# Patient Record
Sex: MALE | Race: WHITE | Employment: OTHER | URBAN - METROPOLITAN AREA
[De-identification: names, ages, dates, MRNs, and addresses within clinical notes are randomized per-mention and may not be internally consistent; named-entity substitution may affect disease eponyms.]

---

## 2017-06-30 PROBLEM — I48.91 ATRIAL FIBRILLATION (HCC): Status: ACTIVE | Noted: 2017-06-30

## 2017-06-30 PROBLEM — N28.9 RENAL INSUFFICIENCY: Status: ACTIVE | Noted: 2017-06-30

## 2017-06-30 PROBLEM — R53.83 FATIGUE: Status: ACTIVE | Noted: 2017-06-30

## 2017-06-30 NOTE — PROGRESS NOTES
Mrs. Shelby Tarango is a 49-year-old white female who was seen on June 30, 2017 for her Medicare annual physical examination. At the time of examination Mrs. Rajani Johnson indicated that she has been feeling fatigued.   She notes that she has had a lack of heart reveals S1 and S2 to appear normal.  No murmurs noted. Her lungs are clear. Her abdomen is protuberant. Bowel sounds are present. There are no masses or organomegaly. Her extremities reveals there to be no edema.   All peripheral pulses are intac vitamin D 1000 units daily. Patient will see Dr. Deangelo Francois next week as noted. I will see the patient back in 2 weeks. I will see the patient back sooner as necessary.   As far as the patient's symptoms of fatigue and concern about depression I have josse hazards?: 0-No    Are you on multiple medications?: 1-Yes    Does pain affect your day to day activities?: 0-No     Have you had any memory issues?: 0-No    Fall/Risk Scorin          Depression Screening (PHQ-2/PHQ-9): Over the LAST 2 WEEKS   Little in 20/25 Left Eye Chart Acuity: 20/40     Cognitive Assessment     What day of the week is this?: Correct    What month is it?: Correct    What year is it?: Correct    Recall \"Ball\": Correct    Recall \"Flag\": Correct    Recall \"Tree\": Correct        Zaynab Santana Screening Mammogram      Mammogram  Annually Mammogram,1 Yr due on 10/19/2015 Update Health Maintenance if applicable   Immunizations      Influenza No orders found for this or any previous visit.  Update Immunization Activity if applicable    Pneumococca

## 2017-06-30 NOTE — PATIENT INSTRUCTIONS
1.  Patient is to continue her current diet, medications and activity. 2.  I will increase the patient's Synthroid dose to 0.125 MCG's orally daily. 3.  I will start the patient on Cardizem  mg orally daily.   4.  I will start the patient on Eliquis

## 2017-07-14 NOTE — PATIENT INSTRUCTIONS
1.  Patient is to continue his her current diet, medications and activity. 2.  Patient is to follow-up with Dr. Vero Galvan. 3.  We will consider switching the patient to Xarelto when the patient runs out of her Eliquis prescription.   4.  I will plan to s

## 2017-07-14 NOTE — PROGRESS NOTES
Santos Lewis is a 76year old female. Patient presents with:  Checkup: 2 wk f/u  Hypertension  Atrial Fibrillation    HPI:   Patient presents with:  Checkup: 2 wk f/u  Hypertension  Atrial Fibrillation    Pt feels better.   Pt was back in NSR when sh complaints of pain or swelling in patient's legs    EXAM:   /66 (BP Location: Left arm, Patient Position: Sitting, Cuff Size: large)   Pulse 72   Temp 98.4 °F (36.9 °C) (Oral)   Ht 5' 2\" (1.575 m)   Wt 195 lb 3.2 oz (88.5 kg)   SpO2 96%   BMI 35.70

## 2017-09-13 PROBLEM — E83.52 HYPERCALCEMIA: Status: ACTIVE | Noted: 2017-09-13

## 2017-09-13 NOTE — PATIENT INSTRUCTIONS
1.  Patient is to continue her current diet, medications and activity. 2.  Patient is to increase her vitamin D to 2000 units daily. 3.  Patient may resume her calcium supplementation at this time.   4.  I will increase the patient's Synthroid to 0.150 mg

## 2017-09-13 NOTE — PROGRESS NOTES
Noah Gardner is a 76year old female. Patient presents with:  Checkup: 2 mo f/u  Hypertension  Atrial Fibrillation    HPI:   Patient presents with:  Checkup: 2 mo f/u  Hypertension  Atrial Fibrillation    Pt feels well.   Pt is now on Xarelto as portia Left arm, Patient Position: Sitting, Cuff Size: large)   Pulse 84   Temp 98.2 °F (36.8 °C) (Oral)   Ht 5' 2\" (1.575 m)   Wt 196 lb (88.9 kg)   SpO2 98%   BMI 35.85 kg/m²   GENERAL: well developed, well nourished in no acute distress  HEENT: normal orophar function continues to show renal insufficiency. Patient's BUN is 29, creatinine is 1.17 and GFR is 45. Patient is to push fluids. 8. Vitamin D deficiency  Patient's vitamin D level is slightly low at 27.8.   She is to increase her vitamin D to 2000 uni

## 2017-12-13 NOTE — PATIENT INSTRUCTIONS
1.  She is to continue her current diet, medication and activity. 2.  Patient is to take MiraLAX on a daily basis to see if this helps with her upper abdominal pain. 3.  Patient is to receive her Pneumovax and flu vaccine today.   4.  I will see the patie

## 2017-12-13 NOTE — PROGRESS NOTES
Gricelda Dsouza is a 76year old female. Patient presents with:  Checkup: 3 month. ongoing stomach aches-intermittently. States this has been going on for years. It last different amounts of time in different areas of her abdomen.    Hypertension  Atria HCl ER Coated Beads (CARDIZEM CD) 120 MG Oral Capsule SR 24 Hr Take 1 capsule (120 mg total) by mouth daily. Disp: 30 capsule Rfl: 11   lisinopril 10 MG Oral Tab Take 1 tablet (10 mg total) by mouth every morning.  Disp: 90 tablet Rfl: 3   Triamterene-HCTZ PLAN:   1. Essential hypertension  Doing well.  CPM.  I will see the patient back in 3 months with a BMP panel. Patient was given her flu vaccine and her Pneumovax vaccine today. I will see the patient back sooner as necessary.     2. Atrial fibrillation,

## 2018-01-15 NOTE — TELEPHONE ENCOUNTER
Verified with patient that she is taking the Triamterene-HCTZ every day (not every other day as per med module). Refill sent.

## 2018-02-05 NOTE — TELEPHONE ENCOUNTER
Pt confirmed that she takes lisinopril 10mg daily.      Refill request is for a maintenance medication and has met the criteria specified in the Ambulatory Medication Refill Standing Order for eligibility, visits, laboratory, alerts and was sent to the requ

## 2018-02-05 NOTE — TELEPHONE ENCOUNTER
Refill request received for lisinopril 10mg QD from [employer]. Last refill authorization sent 12/23/16 # 90 with 3 refills. Attempted to call patient to clarify the refill request. Gilbert.

## 2018-03-11 NOTE — PROGRESS NOTES
Nasim Zepeda is a 76year old female. HPI:     CC:  Patient presents with:  Full Skin Exam: LOV- 5-18-16. Pt presenting today with dry lesion to nose x several years and requesting a full body skin check.  Pt denies a personal/family hx of SC. MG Oral Capsule SR 24 Hr Take 1 capsule (120 mg total) by mouth daily. Disp: 30 capsule Rfl: 11   Probiotic Oral Cap Take 1 capsule by mouth daily. take 1 tab daily  Disp:  Rfl:    Omega 3 1000 MG Oral Cap Take  by mouth.  take 1 by Oral route  every day Jayashree Eden total-body performed, including scalp, head, neck, face,nails, hair, external eyes, including conjunctival mucosa, eyelids, lips external ears, back, chest,/ breasts, axillae,  abdomen, arms, legs, palms.      Multiple light to medium brown, well marginated keratosis  Extensive lesions. Reassurance given. Lentigo darker slightly eccentric center at left forearm unchanged. Continue observation.     No other susupicious lesions on todays  Exam.    Patient with moderate to severe sun damage spends arce in

## 2018-03-28 NOTE — PATIENT INSTRUCTIONS
1.  Patient is to continue her current diet, medication and activity. 2.   I will plan see the patient back in 3 months with blood tests and urinalysis for her annual physical examination.   The blood tests will include a CBC, CMP, lipid panel, TSH and vi

## 2018-03-28 NOTE — PROGRESS NOTES
Crystal Ratliff is a 76year old female. Patient presents with:  Checkup: 3 month  Hypertension  Atrial Fibrillation    HPI:   Patient presents with:  Checkup: 3 month  Hypertension  Atrial Fibrillation    Pt feels well.  Pt is getting over a recent col Packs/day: 1.00      Years: 50.00        Types: Cigarettes  Smokeless tobacco: Never Used                      Alcohol use:  Yes              Comment: 3 drinks/week       REVIEW OF SYSTEMS:   GENERAL HEALTH: feels well otherwise  R triglycerides 143, HDL cholesterol is 42 and LDL cholesterol was 122. CPM.    5. Elevated fasting glucose  Doing well.  CPM.  Patient's recent FBS was 100.    6. Primary osteoarthritis involving multiple joints  Stable.   CPM.    7. Renal insufficiency  St

## 2018-06-29 NOTE — PROGRESS NOTES
HPI:   Hansel Hernandez is a [de-identified]year old female who was seen by me for her Medicare annual physical examination on June 29, 2018. At the time of examination Mrs. Keo Lopez was feeling well. She has no complaints.   Patient did not have the previously 11      Past Medical History:   Diagnosis Date   • A-fib ([facility] [address] 2017      Past Surgical History:  No date: NEEDLE BIOPSY LEFT   Family History   Problem Relation Age of Onset   • Heart Disease Father 61     CAD   • Cancer Daughter      THYROID   • Cancer So Alert and oriented, CN are intact, DTRs are 1+ bilaterally    Patient did not have an EKG today as she has her EKGs with her cardiologist.  Patient did not have blood tests or urinalysis prior to today's examination.   She will have them done after today's than 10 pounds without trying?: 2 - No    Has your appetite been poor?: No    Type of Diet: Balanced    How does the patient maintain a good energy level?:  (exercise-not daily)    How would you describe your daily physical activity?: Light    How would yo energy: More than half the days  5. Poor appetite or overeating: More than half the days  6. Feeling bad about yourself - or that you are a failure or have let yourself or your family down: Several days  7.  Trouble concentrating on things, such as reading because I misunderstand what they say:  No   I misunderstand what others are saying and make inappropriate responses:  Sometimes I avoid social activities because I cannot hear well and fear I will reply improperly:  No   Family members and friends have to Visit Annually Pt sees her Ophthalmologist.    Grant Ricci Every two years Last Dexa Scan:   XR DEXA BONE DENSITOMETRY (CPT=77080) 06/22/2018    No flowsheet data found.    Pap and Pelvic      Pap: Every 3 yrs age 21-65 or Pap+HPV e Annually No results found for: A1C No flowsheet data found. Creat/alb ratio  Annually      LDL  Annually LDL Cholesterol (mg/dL)   Date Value   06/29/2018 150 (H)   06/22/2016 110 (H)    No flowsheet data found.      Dilated Eye exam  Annually No flowshe

## 2018-06-29 NOTE — PATIENT INSTRUCTIONS
1.  Patient is to continue her current diet, medication and activity. 2.  Patient to continue to watch her diet and exercise on a regular basis. 3.  Patient will have previously ordered blood tests and urinalysis performed today or in the near future.   I

## 2018-09-01 NOTE — TELEPHONE ENCOUNTER
Telephone call to pt and recent labs were discussed with pt. Pt will watch her diet regarding her Cholesterol. Pt is to continue to take Vitamin D. Pt has an appt to see me in January.   I will place an order in the sytem for pt to have blood tests prior

## 2018-10-08 NOTE — PROGRESS NOTES
Felipe Marshall is a [de-identified]year old female. HPI:     CC:  Patient presents with:  Lesion: LOV: 2-26-18. Pt presents today with lesion under L eye x 1 year pt denies a personal/family hx of SC. Allergies:  Brimonidine;  Latanoprost    HISTORY: Oral route  every day Disp:  Rfl:    Levothyroxine Sodium (SYNTHROID) 150 MCG Oral Tab Take 1 tablet (150 mcg total) by mouth before breakfast. Disp: 90 tablet Rfl: 3     Allergies:     Brimonidine             OTHER (SEE COMMENTS)    Comment:Red eyes  Tali with lesion under L eye x 1 year pt denies a personal/family hx of SC. Patient presents with concerns above. Patient has been in their usual state of health. History, medications, allergies reviewed as noted.       ROS:  Denies any other systemic Reassurance given regarding benign seborrheic keratosis no treatment necessary fairly close to the eyes so would hold off on cryo. Patient concerned since her  with [facility] diagnosed at left lower lid. Reassurance given.   Observe currently this change

## 2019-01-07 NOTE — PATIENT INSTRUCTIONS
1.  Patient is to continue her current diet, medication and activity. 2.  She is to continue to watch her diet, remain active and attempt to watch her weight. 3.  Patient will have a flu vaccine and shingles vaccine in the near future.   4.  I will see th

## 2019-01-07 NOTE — PROGRESS NOTES
Hansel Hernandez is a [de-identified]year old female. Patient presents with:  Checkup: 6 month check up. Patient feels well. She will get her flu shot and her second shingles with her son who is a physician.    Hypertension  Atrial Fibrillation  Hyperlipidemia  Thyr 50.00        Pack years: 50        Types: Cigarettes      Smokeless tobacco: Never Used    Alcohol use: Yes      Comment: 3 drinks/week    Drug use: No       REVIEW OF SYSTEMS:   GENERAL HEALTH: feels well otherwise  RESPIRATORY:No cough or SOB  CARDIOVASC insufficiency  Patient's renal function is improved from before. Her recent BUN was 19, creatinine is 1.05 and GFR is 50.   CPM.    8. Vitamin D deficiency  Doing well.  CPM.  Patient's vitamin D level is 39.8.  CPM.    9. Diverticulosis of large intestine

## 2019-06-10 NOTE — PROGRESS NOTES
Noah Gardner is a 78year old female. Patient presents with:  Derm Problem: New pt pt presenting today with rash to bilateral arms and legs for 10 days. pt has been using cerave and amalactin, some improvement. Brimonidine;  Birdia Collet years: 50        Types: Cigarettes      Smokeless tobacco: Never Used    Alcohol use: Yes      Comment: 3 drinks/week    Drug use:  No                  Current Outpatient Medications:  Calcium (RA CALCIUM) 500 MG Oral Tab once daily Disp:  Rfl:    E[place]april Socioeconomic History      Marital status:       Spouse name: Not on file      Number of children: Not on file      Years of education: Not on file      Highest education level: Not on file    Occupational History      Not on file    Social Needs Grandmother 80   • Breast Cancer Maternal Aunt 75                      HPI :      Patient presents with:  Derm Problem: New pt pt presenting today with rash to bilateral arms and legs for 10 days. pt has been using cerave and amalactin, some improvement. various products including moisturizers, creams soaps cleansers detergent etc. reviewed with patient. Potential allergen, irritants reviewed as well. Pathophysiology reviewed. Consider Contact allergy in differential.  Consider patch testing.   Patient w

## 2019-09-05 PROBLEM — N18.30 STAGE 3 CHRONIC KIDNEY DISEASE (HCC): Status: ACTIVE | Noted: 2019-09-05

## 2019-09-05 NOTE — PATIENT INSTRUCTIONS
1.  Patient is to continue her current diet, medication and activity. 2.  I will decrease the patient's dose of levothyroxine to 0.137 mg orally daily. 3.  I will stop the patient's triamterene/Dyazide today.   4.  I will plan to see the patient back in 3

## 2019-09-06 NOTE — PROGRESS NOTES
HPI:   Mora Jacinto is a 78year old female who was seen by me on September 5, 2019 for her Medicare annual physical examination. At the time of examination Mrs. Adriana Marie was feeling well.   Patient's daughter has asked the patient to check on why SL Tab Place 1 capsule under the tongue daily. Disp:  Rfl:    Rivaroxaban (XARELTO) 20 MG Oral Tab Take 20 mg by mouth daily with food. Disp:  Rfl:    Multiple Vitamins-Minerals (MULTIVITAL-M) Oral Tab Take 1 tablet by mouth daily.  Disp:  Rfl:    Polyethyl TM's  EYES:PERRLA, EOMI,conjunctiva are clear, sclerae are nonicteric  NECK: supple, no cervical or supraclavicular LAD, no carotid bruits, no JVD  CHEST: no chest tenderness  BREAST: Patient's breasts were not examined at the request of the patient.   LUNG lisinopril as necessary. 3. Hypercholesteremia  Patient's recent lipid panel had a cholesterol 182, triglycerides 154, HDL cholesterol is 46 and LDL cholesterol was 105. Patient is to continue her dietary control.    - LIPID PANEL; Future    4.  Atrial more than 10 pounds without trying?: 2 - No    Has your appetite been poor?: No    Type of Diet: Balanced    How does the patient maintain a good energy level?: Other    How would you describe your daily physical activity?: Moderate    How would you descri Uncorrected   Right Eye Chart Acuity: 20/50 Left Eye Chart Acuity: 20/50     Cognitive Assessment     What day of the week is this?: Correct    What month is it?: Correct    What year is it?: Correct    Recall \"Ball\": Correct    Recall \"Flag\": Correct Annually if high risk No results found for: CHLAMYDIA No flowsheet data found. Screening Mammogram      Mammogram  Annually There are no preventive care reminders to display for this patient.  Update Health Maintenance if applicable   Immunizations

## 2019-12-09 PROBLEM — E21.3 HYPERPARATHYROIDISM (HCC): Status: ACTIVE | Noted: 2019-12-09

## 2019-12-09 NOTE — PROGRESS NOTES
Tamela Irizarry is a 78year old female. Patient presents with:  Checkup: 3 month check up. Fees well. Feeling like she has less patience lately.    Hypertension  Hyperlipidemia  Thyroid Problem    HPI:   Patient presents with:  Checkup: 3 month check u GENERAL HEALTH: feels well otherwise  RESPIRATORY:No cough or SOB  CARDIOVASCULAR: No chest pain  GI: No abdominal pain, nausea, vomiting, diarrhea, or constipation  :No Urinary complaints  EXT:No complaints of pain or swelling in patient's legs    EXA this time. Her main treatment at this time is her rate control. 4. Hypothyroidism, unspecified type  Doing well.  CPM.  Patient's recent TSH was 0.505. Patient's dose of Synthroid was decreased at her last visit.   Patient's current dose of Synthroid a

## 2019-12-09 NOTE — PATIENT INSTRUCTIONS
1.  Patient is to continue her current diet, medication and activity. 2.  Patient will obtain her flu vaccine and her Tdap vaccine from her son.   3.  I will refer the patient see Dr. Elie Romero for evaluation of her hypercalcemia and probable hyperparath

## 2019-12-10 NOTE — TELEPHONE ENCOUNTER
Salma Nunez,      Mrs. Surya Juarez is a patient under my care. I have been following and evaluating her hypercalcemia for some time. There are times it is come down other times it has come back up.   Patient had a recent blood test that showed

## 2019-12-11 NOTE — TELEPHONE ENCOUNTER
Thank you for the update. It does look like she got an earlier appointment with Dr. Radames Lindsay so I will also forward information to her.

## 2020-01-07 NOTE — TELEPHONE ENCOUNTER
This can wait till Wednesday. Patient made an appt with Dr. Jonathan Galicia for 3/19 per the instructions patient was given at last office visit for a parathyroid check.     Patient also made an appointment for 1/31/20 with Dr. Morena Acuna who is in the same pract

## 2020-01-09 NOTE — TELEPHONE ENCOUNTER
Patient was seen today at the time of her 's examination. Patient has pain in the left side of her neck and ecchymosis over her anterior neck. I will place an order in the system for the patient get an ultrasound of her neck.   Patient will also ha

## 2020-01-09 NOTE — TELEPHONE ENCOUNTER
Noted.  Discussed with patient. I told her that it is fine to proceed and see Dr. Eb Simeon January 31 as she is currently scheduled to do.

## 2020-01-31 NOTE — H&P
New Patient Evaluation - History and Physical    CONSULT - Reason for Visit:  Hypoparathyroidism  Requesting Physician: Dr. Aislinn Tay:  Patient presents with:  Consult: Hypothyroidism, parathyroid issues.  Referred by Dr. Gio Jones capsule by mouth daily. take 1 tab daily      • Polyethylene Glycol 3350 (MIRALAX) Oral Powd Pack Take 17 g by mouth daily.  1 packet 11       ALLERGIES:    Brimonidine             OTHER (SEE COMMENTS)    Comment:Red eyes  Latanoprost             OTHER (SEE for: rash, blister, cellulitis,      PHYSICAL EXAM:    01/31/20  0931   BP: 121/74   Pulse: 74   Weight: 196 lb 9.6 oz (89.2 kg)     BMI: Body mass index is 35.96 kg/m².      CONSTITUTIONAL:  awake, alert, cooperative, no apparent distress, and appears stat health  Discussed that PTH is a RF for Osteoporosis. - Repeat DXA in July 2020  - no h/o Tobacco, alcohol, steroid use  - Vitamin D levels normal in 7/2020. Will follow Vit D levels.    - Fall precautions    Patient verbalized a complete  understanding of

## 2020-02-03 NOTE — TELEPHONE ENCOUNTER
From: Kelvin Mccollum  To: Guille Dennis MD  Sent: 2/1/2020 8:41 AM CST  Subject: Other    I am canceling my appt. with Dr. Kalyn Andrade for March 16th.   Thank you,  Akua Devries   [phone]

## 2020-05-12 NOTE — TELEPHONE ENCOUNTER
Please advise  Component      Latest Ref Rng & Units 5/6/2020   Glucose      70 - 99 mg/dL 100 (H)   Sodium      136 - 145 mmol/L 142   Potassium      3.5 - 5.1 mmol/L 4.4   Chloride      98 - 112 mmol/L 110   Carbon Dioxide, Total      21.0 - 32.0 mmol/L

## 2020-05-13 NOTE — TELEPHONE ENCOUNTER
PTh has high PTh that is cuasing her calcium to be high  We are monitoring ca for now  During her visit with me she wa son ca and MV, which I had recommended she stop  Did she stop it?    If not, she should stop  Her recent calcium is a little higher than b

## 2020-05-14 NOTE — TELEPHONE ENCOUNTER
Attempted to call pt, although after ringing multiple times, it goes straight to dial tone. Will attempt to contact patient at a later time.

## 2020-05-18 NOTE — TELEPHONE ENCOUNTER
Called patient with blood test results. Patient verbalized understanding and states she did stop MV supplementation /CA  Will repeat labs in 3-4 weeks.

## 2020-05-18 NOTE — TELEPHONE ENCOUNTER
Called patient with blood test results. Patient verbalized understanding and states she did stop MV supplementation /CA  Will repeat labs in 3-4 weeks.   Lab ordered

## 2020-05-18 NOTE — TELEPHONE ENCOUNTER
From: Gricelda Dsouza  To: Sea Gallegos MD  Sent: 5/18/2020 11:54 AM CDT  Subject: Test Results Question    I had blood work done on May 6th as I was supposed to.  I have read the results on my iPad and really have no idea how to decipher them and

## 2020-06-16 NOTE — TELEPHONE ENCOUNTER
Her recent calcium is high but stable  It is 10.6 now.    PLAN:   - Repeat CMP, PTH, 25 OH vitamin D  in 4 months to monitor for stability  - Hydration  - Stay active as much as possible  - Call if she develops symptoms of high calcium    Please book apt in

## 2020-06-17 NOTE — TELEPHONE ENCOUNTER
Called patient and gave her message by dr Gurpreet Hurt. Patient verbalized understanding and will have labs repeated in 4 mo. Labs ordered.

## 2020-07-09 NOTE — TELEPHONE ENCOUNTER
DXA shows normal BMD  Patient is to repeat labs in 4 months from mid of July and FU in clinic ( please see encounter from June 2020)  Please book apt now, if it's not booked on time, we are not able to find slots closer to the time  Thanks

## 2020-07-10 NOTE — TELEPHONE ENCOUNTER
Called patient with message by dr Olena Brooks. Patient verbalized understanding and made laura 11/10/20 with provider. Labs are ordered.

## 2020-09-02 NOTE — TELEPHONE ENCOUNTER
To Dr. Abdirashid Bradley----    Per LOV:  \"I will plan to see the patient back in 3 months with blood tests which will include a BMP, lipid panel. \"    BMP and lipid pended for review. Did you want additional labs?

## 2020-09-02 NOTE — TELEPHONE ENCOUNTER
Noted.  I reviewed patient's chart. Patient is actually due for her Medicare annual physical examination. I have adjusted the patient's orders. Please notify the patient that I have placed orders in the system to be done and that her exam should be her Medicare annual physical examination. I will route this to nursing.   Thank you!!

## 2020-09-03 NOTE — TELEPHONE ENCOUNTER
changed appt to Jazzy Li  Not able to schedule 60 minute appt as appt already in time slot after pt appt

## 2020-09-03 NOTE — TELEPHONE ENCOUNTER
S/w spouse vicente and relayed MDs message. Informed spouse that patient needs to fast and schedule a lab appt online.      To  to change 9/9 appt to a medicare annual exam.

## 2020-09-24 NOTE — TELEPHONE ENCOUNTER
Labs stable. Calcium remains high  I will discuss further at her upcoming apt.    Apt is in nov   Please repeat CMP 2-3 days before apt   Do not repeat before that  Please review symptoms of high calcium, to call if she experiences any  Thansk

## 2020-09-25 NOTE — TELEPHONE ENCOUNTER
rn called patient and gaver her message by dr Yang Nicole, patient verbalized understanding of instructions.  Via repeat back and states will write down  Reminded of laura 11/10/20 at 9am  Also inqured about supplements states she is taking mvi and vit D 5000 u

## 2020-09-25 NOTE — TELEPHONE ENCOUNTER
Attempted to contact Maritza Olmedo:    Patients , Nick Hernandez, answered and asked to call back in 10 minutes so he could go put his hearing aids on. Called again, and , Nick Hernandez, states she is not home.  Advised  to have her call clinic back re

## 2020-10-09 PROBLEM — N18.31 STAGE 3A CHRONIC KIDNEY DISEASE (HCC): Status: ACTIVE | Noted: 2019-09-05

## 2020-10-09 PROBLEM — M15.9 PRIMARY OSTEOARTHRITIS INVOLVING MULTIPLE JOINTS: Status: ACTIVE | Noted: 2020-10-09

## 2020-10-09 NOTE — PROGRESS NOTES
HPI:   An Glez is a 78year old female who was seen by me on October 9, 2020 for her Medicare annual physical examination. At the time the examination Mrs. Radha Pickett was feeling well.   Patient was placed on Prozac earlier this year on the rec DAILY 90 capsule 3   • Levothyroxine Sodium 137 MCG Oral Tab Take 137 mcg by mouth before breakfast. 90 tablet 3   • Cholecalciferol (VITAMIN D) 2000 units Oral Cap Take 1 capsule by mouth daily.      • Cyanocobalamin (VITAMIN B-12) 5000 MCG Sublingual SL T 34.59 kg/m²     GENERAL: well developed, well nourished,in no apparent distress  SKIN: no rashes,no suspicious lesions  HEENT: atraumatic, normocephalic, normal oropharynx, normal TM's  EYES:PERRLA, EOMI,conjunctiva are clear, sclerae are nonicteric  NECK: up-to-date on her pneumonia vaccines. I will start the patient on Keflex 250 mg orally 4 times a day for 10 days. Patient will have a follow-up urinalysis with urine culture reflex after she completes her course of antibiotics.   I have advised the patien joints  Stable. CPM.    9. Stage 3a chronic kidney disease  Stable. CPM.  Patient's recent CMP had a BUN of 17, creatinine 1.08 and a GFR of 48. Patient's renal function has been stable recently. - BASIC METABOLIC PANEL (8); Future    10.  Vitamin D d help    Dressing: Able without help    Eating: Able without help    Driving: Able without help    Preparing your meals: Able without help    Managing money/bills: Able without help    Taking medications as prescribed: Able without help    Are you able to a 09/22/2020 95    No flowsheet data found. Cardiovascular Disease Screening     LDL Annually LDL Cholesterol (mg/dL)   Date Value   09/08/2020 121 (H)        EKG One Time EKG was done torobles.     Colorectal Cancer Screening      Colonoscopy Screen every 1 Immunization Activity if applicable     SPECIFIC DISEASE MONITORING Internal Lab or Procedure External Lab or Procedure   Annual Monitoring of Persistent     Medications (ACE/ARB, digoxin, diuretics)    Potassium  Annually Potassium (mmol/L)   Date Value

## 2020-10-09 NOTE — PATIENT INSTRUCTIONS
1.  Patient is to continue her current diet, medication and activity. 2.  Patient has recently received her flu vaccine. 3.  Patient is up-to-date on her pneumonia vaccines.   4.  I will place the patient on Keflex/cephalothin 250 mg orally 4 times a day

## 2020-11-06 NOTE — TELEPHONE ENCOUNTER
Noted. Please call patient and notify her that her recent mammogram has turned out well. There is no evidence of breast cancer. I will route this to nursing.   Thank you!!

## 2020-12-02 NOTE — TELEPHONE ENCOUNTER
Telephone call to patient and recent lab results discussed. Patient's fasting blood sugar slightly elevated. Patient has chronic kidney disease stage III which appears stable.   Patient's lipid panel showed her cholesterol and LDL cholesterol to be slight

## 2020-12-22 NOTE — TELEPHONE ENCOUNTER
Please advise latest labs in 9/2020    Component      Latest Ref Rng & Units 9/22/2020   PTH INTACT      18.5 - 88.0 pg/mL 213.6 (H)

## 2020-12-30 NOTE — TELEPHONE ENCOUNTER
rn called patient with message by dr Roz Lawrence, patient verbalized understanding ,had no further questions

## 2020-12-30 NOTE — PROGRESS NOTES
Return Office Visit     CHIEF COMPLAINT:     Primary Hyperparathyroidism    HISTORY OF PRESENT ILLNESS:  Amanda Ga is a 78year old female who presents for follow up for  evaluation of hyperparathyroidism.    Noted to have calcium elevation for t family history marked as reviewed. See past social history marked as reviewed.     ASSESSMENTS:     REVIEW OF SYSTEMS:  Constitutional: Negative for: weight change, fever, fatigue, cold/heat intolerance  Eyes: Negative for:  Visual changes, proptosis, blur milk/calcium consumption. No history of immobilization. -    TSH is normal. No symptoms or signs of adrenal insufficiency or acromegaly. The patient has primary hyperparathyroidism.   DA normal 2020  Based on CKD, she does meet criteria for surgical manag

## 2021-02-15 NOTE — PATIENT INSTRUCTIONS
1.  Patient is to continue her current diet, medication and activity. 2.  Patient is to get her second Covid vaccine as she is scheduled to receive. 3.  I I will plan to see the patient back in 4 months with blood tests as ordered.   4.  I will see the pa

## 2021-02-15 NOTE — PROGRESS NOTES
Lidia Torres is a 78year old female. Patient presents with:  Checkup  Hypertension  Hyperlipidemia  Thyroid Problem    HPI:   Patient presents with:  Checkup  Hypertension  Hyperlipidemia  Thyroid Problem    Pt feels well.   Pt's 81 y/o mother is st Location: Right arm, Patient Position: Sitting, Cuff Size: large)   Pulse 64   Temp 98.3 °F (36.8 °C) (Oral)   Ht 5' 2\" (1.575 m)   Wt 189 lb (85.7 kg)   SpO2 99%   BMI 34.57 kg/m²   GENERAL: well developed, well nourished in no acute distress  HEENT: nor CPM.    11. Fatigue, unspecified type  Doing well.  CPM.      The patient indicates understanding of these issues and agrees to the plan. The patient is asked to return in 4 months with blood tests as noted above. Rin Luis MD  2/15/2021  10:

## 2021-06-17 NOTE — PATIENT INSTRUCTIONS
1.  Patient is to continue her current diet, medication and activity. 2.  Patient has had her Covid vaccines. 3.  I will see the patient back in 4 months with blood tests, urinalysis and EKG for her annual physical examination.   4.  I will see the patichelle

## 2021-06-17 NOTE — PROGRESS NOTES
Beltran Youngblood is a 78year old female. Patient presents with:  Checkup  Hypertension  Hyperlipidemia  Thyroid Problem  Atrial Fibrillation    HPI:   Patient presents with:  Checkup  Hypertension  Hyperlipidemia  Thyroid Problem  Atrial Fibrillation Packs/day: 1.00        Years: 50.00        Pack years: 50        Types: Cigarettes      Smokeless tobacco: Never Used    Vaping Use      Vaping Use: Never used    Alcohol use: Yes      Comment: 3 drinks/week    Drug use: No       REVIEW OF SYSTEMS:   GENER atrial fibrillation (HCC)  Stable. CPM.  Patient's rate appears to be controlled. Patient's pulse today is about 72 bpm.    4. Hypothyroidism, unspecified type  Stable. CPM.    5. Elevated fasting glucose  Doing well.  CPM.  Patient's recent FBS was 96.

## 2021-06-17 NOTE — TELEPHONE ENCOUNTER
Patient called back , rn gave her message by dr Jaquelin Beaulieu. below.  states she was taking every other day but will discontinue and will discuss further at laura

## 2021-06-28 NOTE — PROGRESS NOTES
Return Office Visit     CHIEF COMPLAINT:     Primary Hyperparathyroidism    HISTORY OF PRESENT ILLNESS:  Garth Acuña is a 78year old female who presents for follow up for  evaluation of hyperparathyroidism.    Noted to have calcium elevation for t reviewed. See past family history marked as reviewed. See past social history marked as reviewed.     ASSESSMENTS:     REVIEW OF SYSTEMS:  Constitutional: Negative for: weight change, fever, fatigue, cold/heat intolerance  Eyes: Negative for:  Visual taylor insufficiency or acromegaly. The patient has primary hyperparathyroidism. DA normal 2020  Based on CKD, she does meet criteria for surgical management. I again explained this in detail to the patient.    After a discussion, she will like to observe at t

## 2021-06-28 NOTE — TELEPHONE ENCOUNTER
Repeat calcium is much better  Let us continue to hold vitamin D  Repeat PTH, CMP and 25 OH vitamin D in three months and call for results  Thanks

## 2021-07-01 NOTE — TELEPHONE ENCOUNTER
Spoke to patient to relay message below - patient stated understanding to continue to hold vitamin D and repeat labs in 3 months  Labs ordered

## 2021-07-06 PROBLEM — F43.23 ADJUSTMENT DISORDER WITH MIXED ANXIETY AND DEPRESSED MOOD: Status: ACTIVE | Noted: 2021-07-06

## 2021-09-23 NOTE — TELEPHONE ENCOUNTER
UTI symptoms:    []Frequency  []Urgency  []Pain/burning  [x]Blood in urine  []Low back pain  []Flank pain  []Fevers/chills  []Odor  [x]Confusion    NOTES:    Symptom onset: Today. Reports 2 episodes of hematuria.  The first episode was pink in color and lar

## 2021-09-23 NOTE — PATIENT INSTRUCTIONS
You are seen in clinic for bloody urine that she started today. There is concern for possible urinary tract infection for which we are obtaining lab work including blood tests and a urine test.  We will call you with the results of these tests.     We do r

## 2021-09-23 NOTE — PROGRESS NOTES
Chief Complaint:   Patient presents with:  Urinary: Symptoms started this morning, she had a pink shade in the urine. No fever, no back pain. She has had this a few months ago, this was treated by Dr. Helen Bailey.   She did stop her fluoxetine about 3 months Refill   • LISINOPRIL 10 MG Oral Tab TAKE ONE TABLET BY MOUTH IN THE MORNING  90 tablet 3   • LEVOTHYROXINE SODIUM 137 MCG Oral Tab TAKE ONE TABLET BY MOUTH BEFORE BREAKFAST 90 tablet 3   • dilTIAZem HCl ER Coated Beads (CARTIA XT) 120 MG Oral Capsule SR 2 Lymphadenopathy  Endocrine: Polyuria, Polydipsia, Temperature Intolerance    EXAM:   Vital Signs:  Blood pressure 130/82, pulse 96, temperature 98.3 °F (36.8 °C), temperature source Oral, height 5' 2\" (1.575 m), weight 190 lb (86.2 kg), SpO2 97 %.      Con period, some results have not been displayed. A complete set of results can be found in Results Review. No results found for this or any previous visit (from the past 8760 hour(s)).     Last positive UCx 9/8/2020  Susceptibility     Escherichia coli documentation.      Jaleesa Cisneros MD, 09/23/21, 2:40 PM

## 2021-09-23 NOTE — TELEPHONE ENCOUNTER
Patient calling for advise. Blood in urine starting this morning. [address] and more so forgetful. No other symptoms. Patient is on blood thinners.     Best call back number [phone]    [address] [place] [address]

## 2021-09-24 NOTE — TELEPHONE ENCOUNTER
Called for condition update:    [facility] - need condition update on hematuria, also has lab results and urine results    FYI to the pool

## 2021-09-28 NOTE — TELEPHONE ENCOUNTER
Discussed with Dr. Yolis Clark-- he would like to speak with patient for update.     TO Dr. Yolis Clark

## 2021-09-28 NOTE — TELEPHONE ENCOUNTER
Condition update:    Hematuria has resolved. She held her Xarelto for 2 days and safely resumed at 3 days from when I saw her. Her urine is clear yellow.   I reviewed the urine culture which came back negative, however there was a significant amount of py

## 2021-10-05 NOTE — TELEPHONE ENCOUNTER
Pt is upset she has to wait until 12/14 to see  for appt - she just had blood work done a couple of weeks ago - asking if she will need to re do tests before appt

## 2021-10-06 NOTE — TELEPHONE ENCOUNTER
Saw other message by you giving  tests results Dr Tyrone Perez want to fit her in sooner to discuss these results or have pt repeat in 3 mo with fu? .  Please advise

## 2021-10-06 NOTE — PROGRESS NOTES
Return Office Visit     CHIEF COMPLAINT:     Primary Hyperparathyroidism    HISTORY OF PRESENT ILLNESS:  Gricelda Dsouza is a 78year old female who presents for follow up for  evaluation of hyperparathyroidism.    Noted to have calcium elevation for t Negative for: weight change, fever, fatigue, cold/heat intolerance  Eyes: Negative for:  Visual changes, proptosis, blurring  ENT: Negative for:  dysphagia, neck swelling, dysphonia  Respiratory: Negative for:  dyspnea, cough  Cardiovascular: Negative for: again explained this in detail to the patient. After a discussion, she will like to observe at this time.    Recent calcium is 10.8  Vitamin D is low, however,  we will avoid replacement at this time given that can raise her calcium even more     PLAN:  -

## 2021-10-18 PROBLEM — E78.1 HYPERTRIGLYCERIDEMIA: Status: ACTIVE | Noted: 2021-10-18

## 2021-10-18 PROBLEM — R31.0 GROSS HEMATURIA: Status: ACTIVE | Noted: 2021-10-18

## 2021-10-18 NOTE — PROGRESS NOTES
HPI:   Zia Ardon is a 78year old female who was seen by me on October 18, 2021 for her Medicare annual physical examination. At the time of examination Mrs. Dale Hoffman was feeling okay.   Her main problem at this time is much stress that she is take 1 tab daily         Past Medical History:   Diagnosis Date   • A-fib ([facility] [address] 2017      Past Surgical History:   Procedure Laterality Date   • NEEDLE BIOPSY LEFT      done over 20yrs ago.  benign      Family History   Problem Relation Age of Onset   • Radha weeks.  LUNGS: clear to auscultation  CARDIO: RRR, normal S1S2, no murmurs  GI: Protuberant, BS are present, no masses or organomegaly  MUSCULOSKELETAL: back is not tender, no pain or swelling in her legs  EXTREMITIES: no edema, all pulses are intact  NEUR blood pressure is doing well.  CPM.  As above. 3. Permanent atrial fibrillation (HCC)  Stable. CPM.  Patient is maintained on Xarelto at 20 mg orally daily. Patient follows up with her cardiologist, Dr. Lucas Mercedes.     4. Stage 3a chronic kidney diseas  has been an \"in control type of person\". It is difficult for the patient to see her  with a dementia and is also difficulty dealing with some of his changes in personality related to this.   Patient is currently seeing a counselor to help help    Managing money/bills: Able without help    Taking medications as prescribed: Able without help    Are you able to afford your medications?: Yes    Hearing Problems?: Yes     Functional Status     Hearing Problems?: Yes    Vision Problems? : Yes Correct    What month is it?: Correct    What year is it?: Correct    Recall \"Ball\": Correct    Recall \"Flag\": Correct    Recall \"Tree\": Correct        Aurelia Austin's SCREENING SCHEDULE   Tests on this list are recommended by your physician b Update Immunization Activity if applicable    Pneumococcal Orders placed or performed in visit on 12/13/17   • PNEUMOCOCCAL IMM (PNEUMOVAX)   Orders placed or performed in visit on 12/08/14   • PNEUMOCOCCAL VACC, 13 PRINCE IM    Update Immunization Activity i

## 2021-10-18 NOTE — TELEPHONE ENCOUNTER
Staff,  When you call patient, please first offered her tomorrow Tuesday at 12 noon as a consult slot; if that does not work, then look at the other message.   Thank you, Dr. Kristine Quezada

## 2021-10-18 NOTE — TELEPHONE ENCOUNTER
Staff, Dr. Matthew Kim wants me to see this patient as a consult. See if there are any cancellations for this week; if not, please check with [place] if I could see patient this Thursday morning 9 AM.  Please let me know.   Thank you, Dr. Sissy Day

## 2021-10-18 NOTE — PATIENT INSTRUCTIONS
1.  Patient is to continue her current diet, medication and activity. 2.  Patient has received her Covid vaccines. She will obtain her booster when it becomes available. 3.  Patient was given her flu vaccine today.   4.  I will refer the patient see

## 2021-10-18 NOTE — TELEPHONE ENCOUNTER
Salma Dorantes,    I have asked Mrs Danita Mcdermott to see you for evaluation of and episode of gross hematuria that she had a few weeks ago. Mrs Enoc Peck has a history of Atrial Fib and is maintained on Xaralto.  Pt recently has an episode of gross hematu

## 2021-10-21 NOTE — PROGRESS NOTES
Adolfo Clark is a 78year old female. Reason for Consultation:       History provided by Patient.  Referred by Dr. Christo Bailey      History of Present Illness:       Gross Hematuria   Problem started 9/23/2021 when patient had a single episode of g patient's recent calcium was 10.8, patient's recent  parathryroid hormone was 192.4;  Recommend see Dr. Balwinder Emmanuel for gross hematuria\"    Urological History--This is the patient;s first time seeing a urologist     Smoking History & Fume Exposure-- Smoked 1 pack 20 MG Oral Tab Take 20 mg by mouth daily with food. • Probiotic Oral Cap Take 1 capsule by mouth daily. take 1 tab daily      • Polyethylene Glycol 3350 (MIRALAX) Oral Powd Pack Take 17 g by mouth daily.  1 packet 11       Allergies:    Brimonidine effort  Cardiac: irregular rate; regular rhythm ; normal S, S2 ;   Murmurs none  Abdomen: soft, non-tender, non-distended, no organomegaly noted, no masses  Genitourinary:   Flanks  Non-tender to palpation  Bladder normal.  Skin/Hair: no unusual rashes pre single episode of gross hematuria;F ollowed by an episode of small amount of light pink urine. No associated pain or dysuria. On 09/23/2021 RBC = >10. Pt is at increased risk for having tumors or stones of the urinary tract and these need to be excluded.  I = 52. Patient follows up with her PCP, Dr. Sarika Franklin, for this.    (Q13.715) History of UTI  Patient state that she has only had two UTIs. Most recent infection 9/08/2020 Urine culutre = 10,000 - 50,000 CFU/ML Escherichia coli (pansensitive).  Patient Demetrius Gardner to notify you of the results of the CT urogram before performing the procedure. 4. Please hold rivaroxaban–Xarelto for 2 days before the procedure    5. Eat breakfast and lunch on the day of office cystoscopy because we will not be putting you to sleep.

## 2021-10-21 NOTE — PATIENT INSTRUCTIONS
Heddy Schlatter, M.D.      1.  Urine specimen today for cytology--we will notify you of the results by means of \"my chart\".     2. Please schedule CT urogram  --- to investigate of the urinary tract, seeking to fi

## 2021-11-03 NOTE — TELEPHONE ENCOUNTER
rn called patient with message below by dr John Brown, pt verbalized understanding of results , states she doesn't take sunlight , will hydrate better  Copy of new orders mailed to patient

## 2021-11-29 NOTE — TELEPHONE ENCOUNTER
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 We will be scheduling her for same-day surgery at the hospital; we will check with Dr. Norman Hartley, patient's cardiologist if okay to hold Xarelto and aspirin before the procedure. We will let you know. In addition, patient has some non-urology findings of 10/28/2021 CT urogram which patient chose to have performed at [facility]"; this includes a small abnormality in the liver as well as a right spigelian hernia. I am asking patient to follow-up with you concerning these matters.     Many thanks,    Alvaro Barros,   urology

## 2021-11-29 NOTE — TELEPHONE ENCOUNTER
Yu,    I just finished office cystoscopy on [facility] for investigation of an episode of gross hematuria and it shows some small bladder tumors which need to be removed under general anesthesia.   She is on Xarelto for atrial fibrillation apparently since

## 2021-11-29 NOTE — PROGRESS NOTES
PREOPERATIVE DIAGNOSIS:     Gross hematuria     POSTOP DIAGNOSIS:               The same,  No stones; Bladder lesions     PROCEDURE:              Cystoscopy              ANESTHESIA:     2% Xylocaine jelly local;  also see above;     FINDINGS:  Presence of asymptomatic.     Voiding Dysfunction  Patient states she has nocturia 1x; frequency of every 3-4 hrs during the day. She drinks 24 oz of water, 2 cups of coffee and 0 cans of soda. Pt  run to the bathroom when she has the urge to urinate.  She states urge Maternal aunt breast cancer; son has spiral cell sarcoma; Gorss Hematuria - complete work up       ROS--Patient denies CP or SOB    PE -- Heart: Irregular (consistent with known A-Fib); no murmurs   Lungs sounds normal; no rales; no wheezing        11/29/2 DISCUSSION    (R31.0) Gross hematuria  (primary encounter diagnosis)  On 09/23/2021 Episodes of gross hematuria. No associated pain or dysuria. On 09/23/2021 RBC = >10.   On 10/21/2021 Urine cytology = Negative for high-grade urothelial carcinoma. Yang Haider 10/28 multiple years ago. Patient feels this has progressively worsened. The patient wears 2x pads a day. She is not taking any medication for this. 9/23/2021 Urine culture = negative.  She feels this is stable and chooses to continue observation.     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 I send an electronic message to Dr. Shani Silveira pertaining to the situation.  I advise and patient agrees to follow up with he PCP, Dr. Rupa Tang        I explained to patient the benef procedure    9. Please hold and do not take any lisinopril on the morning of the procedure.     10.  I am making arrangements for ceftriaxone IV 1 g antibiotic to be administered on-call to the operating room on the morning of the procedure            Excl

## 2021-11-29 NOTE — PATIENT INSTRUCTIONS
Sonia Murcia M.D.    1.  If you have burning pain with urination, please take over-the-counter \"Azo\" 1 capsule every 8 hours as needed; if the discomfort is only mild, you do not have to purchase this medicatio

## 2021-12-02 NOTE — TELEPHONE ENCOUNTER
Patient seen in office, scheduled cystoscopy, removal and fulguration of bladder tumors, Tuesday 12/28/2021, [facility]/outpatient, went over pre-op instructions

## 2021-12-04 NOTE — TELEPHONE ENCOUNTER
Emir Ma,    Please call patient;   Dr. Lucas Mercedes, patient's cardiologist, gave the okay to hold Xarelto for 2 days before planned surgery and 1 day after. Please put a copy of this note in the patient's preop packet.     Many thanks,    Dr. Annia Wharton

## 2021-12-12 NOTE — PROGRESS NOTES
Beltran Youngblood is a 78year old female.   HPI:     CC:  Patient presents with:  Full Skin Exam: LOV 5/31/19- pt presents for full skin exam, pt deneis any new lesions or concerns, pt denies any personal or family hx of skin cancer        Allergies:  B Allergies:     Brimonidine             OTHER (SEE COMMENTS)    Comment:Red eyes  Latanoprost             OTHER (SEE COMMENTS)    Comment:Red eyes  Bimatoprost             OTHER (SEE COMMENTS)    Comment:Eye redness    Past Medical History:   Diagnosis with:  Full Skin Exam: LOV 5/31/19- pt presents for full skin exam, pt deneis any new lesions or concerns, pt denies any personal or family hx of skin cancer    Concern with multiple pigmented lesions over the arms back.   History of psoriasis    No persona nevi    Psoriasiform dermatitis stable continue topicals as needed    Waxy tan keratotic papules lesions in areas of concern as noted reassurance given. Benign nature discussed.   Possibility of cryo, alphahydroxy acids over-the-counter retinol's discussed

## 2021-12-28 NOTE — TELEPHONE ENCOUNTER
Urology nurses,    Please call patient 12/29/2021 Wednesday morning after 8:15 AM and check on color of urine; if color of urine is good, have her come to the office to have Vargas catheter removed.   Upon removal of Vargas catheter if she voids and color of point your urine becomes bloody, hold the rivaroxaban on that particular day and call our office to notify us as to what your condition is; if you were to have recurrent pink or red urine, we might temporarily have you take rivaroxaban every other day and

## 2021-12-28 NOTE — H&P
PREOPERATIVE HISTORY AND PHYSICAL          Gross Hematuria   Problem started 9/23/2021 when patient had a single episode of gross hematuria; severity pink. Followed by an episode of small amount of light pink urine.   Patient denies any associated  Dysuri her A-Fib, on Xarelto; stable stage 3a CKD, recent BUN of 22, creatinine 1.00, and GFR 52, will continue monitor;  Hyperparathyroidism, patient's recent calcium was 10.8, patient's recent  parathryroid hormone was 192.4;  Recommend see Dr. Damián Solomon for gross hem hydronephrosis; No ureteral or bladder calculi;  Bilateral renal parapelvic cysts;  Small partially exophytic right 8 mm renal lesion inferiorly with slightly hyperdense contents, possibly hemorrhagic/proteinaceous cyst; Follow-up renal ultrasound should be wall of the right renal pelvis, measuring 1 mm (lower limits of CT resolution); This remains nonspecific; No hydronephrosis; No ureteral or bladder calculi.  On 11/29/2021 Cystoscopy =  Three flat 1 cm tumor , on right lower most posterior wall moving up to note in chart review. She denies history of kidney stones.      (N18.31) Stage 3a chronic kidney disease ([facility] [address]  Patient has known CKD. Most recent 09/23/2021 Creatinine = 1.00; GFR = 52. 10/28/2021 ([facility]) CT urogram (W+WO) = no hydronephrosis.  This is not alternatives to the above treatment options, and I answered questions concerning them; patient understands all of this and decides to proceed with the following:      TREATMENT PLAN :        1.   If you have burning pain with urination, please take over-the

## 2021-12-28 NOTE — BRIEF OP NOTE
[facility] POST ANESTHESIA CARE UNIT  Brief Op Note       Patients Name: Black Blanca  Attending Physician: Fransisco Perry MD  Operating Physician: Sherri De Souza MD  CSN: [id number]     Location:  OR  MRN: [id number]    Date of Birth:

## 2021-12-28 NOTE — INTERVAL H&P NOTE
Pre-op Diagnosis: Bladder tumor [D49.4]    The above referenced H&P was reviewed by Dajuan Alcocer MD on 12/28/2021, the patient was examined and no significant changes have occurred in the patient's condition since the H&P was performed.   I again today

## 2021-12-28 NOTE — ANESTHESIA PROCEDURE NOTES
Airway  Date/Time: 12/28/2021 11:03 AM  Urgency: Elective    Airway not difficult    General Information and Staff    Patient location during procedure: OR  Anesthesiologist: Kellee Howell MD  Resident/CRNA: David Lizarraga CRNA  Performed: LINK

## 2021-12-28 NOTE — OPERATIVE REPORT
[facility]    PATIENT'S NAME: Suleiman Miranda   ATTENDING PHYSICIAN: Harriett Monterroso MD   OPERATING PHYSICIAN: Harriett Monterroso MD   PATIENT ACCOUNT#:   [id number]    LOCATION:  [facility] [address] PACU [address] [place] [address]  MEDICAL RECORD #:   [id number] system, resected the tumor described below.   Then, I switched to monopolar sterile water and took a large rollerball electrode and I totally treated with roller ball electrode the area that I had resected, and I also then destroyed the multiple smaller dulce

## 2021-12-28 NOTE — ANESTHESIA PREPROCEDURE EVALUATION
Anesthesia PreOp Note    HPI:     Naresh Fine is a 78year old female who presents for preoperative consultation requested by: Juaquin Toscano MD    Date of Surgery: 12/28/2021    Procedure(s):  cystoscopy, removal and fulguration of bladder tu readers       Past Surgical History:   Procedure Laterality Date   •       x 5   • CHOLECYSTECTOMY     • COLONOSCOPY     • HERNIA SURGERY     • NEEDLE BIOPSY LEFT      done over 20yrs ago.  benign       dilTIAZem (CARTIA XT) 120 MG Oral Capsule SR Age of Onset   • Heart Disease Father 61        CAD   • Cancer Daughter         THYROID   • Cancer Son         SPIRAL CELL SARCOMA   • Uterine Cancer Maternal Grandmother 80   • Breast Cancer Maternal Aunt 76     Social History    Socioeconomic History TempSrc:  Oral   SpO2:  95%   Weight: 87.5 kg (193 lb)    Height: 1.6 m (5' 3\")         Anesthesia Evaluation     Patient summary reviewed and Nursing notes reviewed    No history of anesthetic complications   Airway   Mallampati: II  Neck ROM: full  Chelo Sine

## 2021-12-28 NOTE — ANESTHESIA POSTPROCEDURE EVALUATION
Patient: Mora Jacinto    Procedure Summary     Date: 12/28/21 Room / Location: [address] [place] [address] MAIN OR 14 / [address] [place] [address] MAIN OR    Anesthesia Start: 2218 Anesthesia Stop: 8094    Procedure: cystoscopy, removal and fulguration of bladder tumors (N/A Urethra) Diagnosis:

## 2021-12-29 NOTE — PROGRESS NOTES
-Pt presents for uribe removal post 21 hospital Cysto; identity verified with name & .  -Pt positioned self on exam table; draped for privacy to lower pants/ underwear to mid-thigh; aspirated 10ml prime from balloon; slowly/gently removed uribe; d

## 2021-12-30 NOTE — TELEPHONE ENCOUNTER
Spoke with pt and scheduled her to f/u with PVK on 1/4/22 to discuss whether or not to do intravesical treatment.

## 2021-12-30 NOTE — TELEPHONE ENCOUNTER
----- Message from Rossana Ortiz MD sent at 12/29/2021  5:15 PM CST -----  Urology staff,  Please arrange and call patient for an appointment to see me during the next 1--30 days; 20-minute visit slot should be enough; purpose is to discuss whether or

## 2022-01-04 NOTE — PROGRESS NOTES
HPI:    Patient ID: Aiden José is a 78year old female. HPI      Bladder Cancer  Diagnosis on 12/28/2021 removal of bladder tumors under general anesthesia =  Low-grade papillary urothelial carcinoma.  Patient has not undergone any definitive t stable stage 3a CKD, recent BUN of 22, creatinine 1.00, and GFR 52, will continue monitor;  Hyperparathyroidism, patient's recent calcium was 10.8, patient's recent  parathryroid hormone was 192.4;  Recommend see Dr. Carlos Culver for gross hematuria\"  10/21/2021 Of 1 tablet (10 mg total) by mouth nightly.  90 tablet 3   • LISINOPRIL 10 MG Oral Tab TAKE ONE TABLET BY MOUTH IN THE MORNING  90 tablet 3   • LEVOTHYROXINE SODIUM 137 MCG Oral Tab TAKE ONE TABLET BY MOUTH BEFORE BREAKFAST 90 tablet 3   • Cyanocobalamin (ZANA Head: Normocephalic and atraumatic. Pulmonary:      Effort: Pulmonary effort is normal. No respiratory distress. Musculoskeletal:         General: Normal range of motion. Cervical back: Normal range of motion. Skin:     General: Skin is dry. anteriorly, correlation with liver panel should be consider; small hiatal hernia;  Punctate hyperdensity along the wall of the right renal pelvis, measuring 1 mm (lower limits of CT resolution); This remains nonspecific; No hydronephrosis;  No ureteral or b patient decides to start Mitomycin-C 40 mg instillation.    (N28.9) Renal lesion  Identified on On 10/28/2021 ([facility]) CT urogram (W+WO) =  \"Small partially exophytic right 8 mm renal lesion inferiorly with slightly hyperdense contents, possibly hemorrhagic Juanita, for this.     (Z87.440) History of UTI  Patient state that she has only had two UTIs. Most recent infection 9/08/2020 Urine culutre = 10,000 - 50,000 CFU/ML Escherichia coli (pansensitive).  Patient recently had an episode of gross hematuria and the follow-up cystoscopy. 3.   Tentatively visit  in 6 months. Renal/kidney ultrasound 1--10 days before visit. To schedule please call [phone]; please have it performed 1 to 10 days before visit.   We will plan to discuss any results during sub

## 2022-01-04 NOTE — PATIENT INSTRUCTIONS
Tala Valverde M.D.    1.  Intravesical (through a catheter into the bladder) mitomycin-C 40 mg instillation–treatments weekly for six  weeks with GOLD James starting Tuesday, February 8, 2022.     2.

## 2022-01-08 NOTE — TELEPHONE ENCOUNTER
Phone call to patient to discuss recent finding of bladder cancer. Patient had episode of hematuria. Patient had cystoscopy with Dr. Eduardo Wesley where patient was found to have multiple superficial bladder tumors which have turned out to be malignant.   For

## 2022-01-17 NOTE — TELEPHONE ENCOUNTER
Spoke to patient and scheduled office appointment for 10/21/21 at [address] [place] [address] 33.9

## 2022-01-31 NOTE — TELEPHONE ENCOUNTER
Spoke with patient. Advised patient that she does not need a  for Mitomycin treatments, she can drive herself to and from. Patient states she has noticed that her incontinence has been getting worse since last visit and she does not think she will be able to hold medication in her bladder the 2 hours after the treatment. Patient states she could try to limit her liquids prior to coming in for the mitomycin treatments. Or would patient be better suited going home with a catheter after treatment?     Please advise

## 2022-02-01 NOTE — TELEPHONE ENCOUNTER
Spoke with pt and notified her of PVK response. Verbalized understanding, states she will try to go to lab today.

## 2022-02-01 NOTE — TELEPHONE ENCOUNTER
Please call patient. Please have her submit  URINE CULTURE  to make sure no infection; I will enter order. ALSO, assuming that there is no infection, we need to wait another 2 weeks before she starts treatment and I am hopeful that by then urination problem improves. Remind patient that she does not have to drink more liquids now than she was doing 2 months ago; if urine is not pink or red, she should drink fluids when thirsty, especially since she has bothered by urinating too frequently and having too strong of an urge. Also have her drastically cut back on coffee and caffeine consumption.   Thank you,  Dr. Saw Kaiser

## 2022-02-04 NOTE — TELEPHONE ENCOUNTER
Patient states she has a procedure scheduled for 02/08. States she had blood in urine this morning and is wondering if she should start medication before procedure.  Please advise

## 2022-02-04 NOTE — TELEPHONE ENCOUNTER
S/W PVK and informed him of pt's issues and informed him of the culture results and he gave verbal orders to tell pt that we can go forward with the Cosme # 1 TX on tues as long as pt starts the Amoxicillin today, ASAP and also takes her dose the morning of the [address] on Tues. I called pt back to inform her of this and she verbalized understanding an compliance.

## 2022-02-04 NOTE — TELEPHONE ENCOUNTER
Hieu Queen RN already spoke with patient.      ----- Message from GOLD Huffman sent at 2/4/2022 10:06 AM CST -----  Please let patient know her urine culture is positive for bacteria. I am going to send amoxicillin 875 mg PO BID x 7 days to her pharmacy. I am going to ask PVK about her mitomycin instillations. PVK patient to start mitomycin next week. Do you want us to proceed as scheduled for delay 1 week? It looks like she called in today with gross hematuria.

## 2022-02-08 NOTE — PROGRESS NOTES
- Pt presents for #1/6 mitomycin instillation.  - Verified pt ID with name and . - Assisted pt to exam table; pt undressed herself from the waist down, draped for privacy. - Prepped the sterile field. - Cleansed perineal area with betadine.  - Instilled lido-gel. - Instructed pt to take deep breaths as inserted the catheter. Inserted 14 Fr straight catheter. Pt tolerated well/no complications. - Clear/yellow urine noted. - Chemo precautions maintained. - Time out preformed. - RAH administered mitomycin 40 mg and removed uribe catheter upon completion.   - Pt redressed herself; all questions answered. - Pt presented with a list of questions. RA provided handwritten responses. - Chemo consent signed.

## 2022-02-15 NOTE — PROGRESS NOTES
Pt was prepped using iodine swabs and lidocaine gel Urojet was inserted using sterile precautions. 15 F straight catheter was inserted without complications, return of clear yellow urine noted. PVK instilled mitomycin. Pt instructed to hold medication in bladder for 2 hours, or for as long as she is able to.

## 2022-02-15 NOTE — PATIENT INSTRUCTIONS
Dulce Gallardo M.D.      1. Please maintain medication in your bladder for 2 hours and then expel it by voiding. During these 2 hours, shift body positions so that the medication reaches different parts of the bladder. 2. Please return in 1 week  for next mitomycin-C instillation.     3.  Tentatively plan for visit June 2022 with renal/kidney ultrasound just before the visit

## 2022-02-17 NOTE — PATIENT INSTRUCTIONS
1.  Patient is to continue her current diet, medication and activity. 2.  Patient has received her COVID vaccine booster and her flu vaccine. 3.  Patient is to follow-up with Dr. Dominique Cortes for intravesical mitomycin infusions for her recently diagnosed bladder cancer. 4.  I will plan to see the patient back in about 4 months with a BMP, lipid panel, AST and ALT. 5.  I will see the patient back sooner as necessary.

## 2022-02-21 NOTE — PROGRESS NOTES
Urology staff,  Please call patient as soon as you can; she has a urinary tract infection and we have to treated and we cannot do a mitomycin-C instillation tomorrow and that has to be canceled; bacteria is Citrobacter Koseri; I electronically sent order to her pharmacy to start cefadroxil 500 mg twice daily and she is to continue it until after she has her mitomycin-C treatment next week; cancel mitomycin-C for tomorrow but keep the one for next week. I hope patient feels better.   Thank you, Dr. Marcia Loyd

## 2022-03-01 NOTE — TELEPHONE ENCOUNTER
Per RA find out if pt has been taking the cefadroxil that PVK prescribed from UTI discovered on urine culture 2/19/2022. - Pt should be good to proceed with mitomycin instillation if she has been taking her antibiotic. - Mitomycin instillation held last week due to UTI. LMTCB, no pt information left in the voicemail.

## 2022-03-01 NOTE — PROGRESS NOTES
- Pt presents for Mitomycin instillation #3/. - Verified pt ID with name and . -  Pt undressed from the waist down and positioned self on exam table. The sterile field was prepared. Cleansed the urethra and labia with iodine.   - Administered lido-jelly into the urethra. Waited 1-2 minutes for pain management. - Slowly inserted 14 Fr straight tip catheter. Clear/yellow urine noted. - Chemo precautions maintained. Timeout was preformed. RA administered the 40 mg mitomycin and removed the catheter when completed. Pt tolerated well. - Pt redressed self. We scheduled a 6th mitomycin appt with Dr. Saw Kaiser.

## 2022-03-08 NOTE — PROGRESS NOTES
- Pt presents for #4/6 mitomycin.  - Pt positioned self on exam table. Sterile field prepared. Cleansed labia with iodine. Administered lido-jelly into urethra for pain management. Slowly inserted 14 FR straight tipped catheter. Clear/yellow urine noted. - RAH administered the medication and slowly removed the catheter once complete. Pt tolerated well.

## 2022-03-22 NOTE — PROGRESS NOTES
Pt presents for mitomycin bladder instillation #5/6.  - Positioned self on exam table; draped for privacy. Sterile field prepped. Cleaned labia with iodine. Inserted lido-jelly into the urethra for pain management. Slowly inserted catheter; clear/yellow urine noted. - Timeout performed. Dr. Maple Opitz administered the medication and removed the catheter once complete. Pt tolerated well.

## 2022-04-11 NOTE — PROGRESS NOTES
I s/w DIALLO to inform her that pt came into the uro  informing of gross hematuria. [facility] gave me verbal orders to collect a midstream urine specimen for UA, C&S, and cytology and to have the pt start taking D-Mannos, OTC prep ASAP to help suppress the incidence of recurrent UTI. We will call her with results and further instructions. I called pt and her dtr into the exam room and I introduced myself and verified her name and . I determined that yesterday morning pt noted her urine was light red in color throughout the stream, no clots. She states that subsequent urine specimens X 2 were very slight pink tinged and she denies any UTI symptoms. She was treated for her last positive culture on  with cefadroxil for 10 days and at [address] [place] [address] with [address] on 3/14 during a T/C enct for UTI complaints she was started on Cephalexin 250 mg 1 tab by mouth nightly as suppressive TX as started by [facility]. Pt apparently was confused because when she saw CÉSAR on 3/22 she thought she was not supposed to start the Cephalexin till 3 days prior to her cysto procedure which is not till . I told pt that according to K's notes at that viist he instructed her to finish the cephalexin till gone as he was aware that [facility] ordered it and did not give any instructions for her to take any ABX 3 days prior to the cysto. I instructed pt to please start the Cephalexin 250 mg nightly tonight and finish all 30 caps. Which should be the day of the procedure or the day before. I also told her that she can provide a urine specimen for the 3 tests DIALLO ordered and place the specimen in the metal box and I will process it and we will call her with the results in a couple days. Pt verbalized understanding and compliance.

## 2022-04-11 NOTE — TELEPHONE ENCOUNTER
Pt is at the  asking to speak with a nurse. Pt stated that she is getting \"to many UTI's \" Blood in Urine, burning . She gets the shivers when urinating.  Pt had Bladder Cancer in the past.

## 2022-04-17 NOTE — TELEPHONE ENCOUNTER
Please notify pt that her recent mammogram has turned out well. No sign of cancer noted. I will route this to nursing.  Thank you!!

## 2022-05-11 NOTE — PATIENT INSTRUCTIONS
Mis Wild M.D.    1.  If urine is clear tomorrow, restart rivaroxaban. 2.  If upon restarting rivaroxaban, urine becomes pink or red, hold the medication for 24 hours, push lots of different oral fluids to increase urine output, thereby diluting out any blood; if color of urine is improved the next day, restart the rivaroxaban; if it is not improved the next day, please call the office on an urgent basis and notify us of the situation. 3.  If you have burning pain with urination not relieved by Tylenol, you can take over-the-counter \"Azo\" 95 mg capsule, 1 to 2 capsules every 8 hours as needed, however, it has to be aware that this medication makes the urine of bright fluorescent orange-red as a side effect and can sometimes cause confusion as to whether it is from the medication or whether it is from bleeding. 4.  We will notify you of the results of the pathology of the small bladder tumor removed today either by means of \"my chart\" or by phone call. 5.  Regardless of the pathology of the small bladder tumor, you are in need of    Office cystoscopy with possible bladder biopsy in August 2022. 6.  Please hold Xarelto for 2 days before the upcoming office cystoscopy    7. Please submit urine for cytology before the next office cystoscopy    8. Standing order for urine culture and complete urinalysis anytime you think you have an infection; if you do submit such a specimen, please call the office 2 to 3 days later for the results; if it were to prove presence of a bacterial urinary tract infection, we would then issue appropriate antibiotic electronically to your pharmacy.     9.  Continue pelvic hygiene measures that we have discussed in the past

## 2022-06-20 NOTE — PATIENT INSTRUCTIONS
1.  Patient is to continue her current diet, medication and activity. 2.  Patient has received her 2 COVID booster vaccines. 3.  I will see the patient back in 4 months with blood test, urinalysis and EKG for her annual physical examination. 4.  I will see the patient back sooner as necessary.

## 2022-08-17 NOTE — TELEPHONE ENCOUNTER
Thanks Dulce Maria Espitia . It has been an honor taking care of your patients. You are  a great physician. Hope to see you at annual medical staff awards evening.     Yulissa Tolliver

## 2022-08-17 NOTE — PROGRESS NOTES
Patient seen in office, scheduled cystoscopy, removal and fulguration of bladder tumors, Tuesday 08/23/2022, [facility]/outpatient, went over pre-op instructions.

## 2022-08-17 NOTE — TELEPHONE ENCOUNTER
Chele Ngo,    Office cystoscopy today showed multiple suspicious flat bladder lesions, too much to handle in the office under local anesthesia. I am scheduling patient for 8/23/2022 cystoscopy with removal of suspicious bladder lesions under general anesthesia, [facility], same-day surgery. She will hold rivaroxaban for 2 days before the procedure. Plan would be for her to go home the same day. I will keep you posted. If there is a recurrence, my intention would be to see her in the office a few days later to discuss. If there is a recurrence of bladder cancer, she would need weekly BCG x6 weeks. Patient is aware that I am retiring on August 31 and that future urological care would be either with partners Dr. Dariel Damian or Dr. Noelle Jenkins.     Many thanks,    Linda Mejia

## 2022-08-17 NOTE — TELEPHONE ENCOUNTER
Noah Deutsch,    Thank you for the update regarding this patient. Also thank you for the care you have provided to my patients and all of our patients over the past many years you have been at [facility].  I want to wish you well in your FDC!!   You will definitely be missed at [facility]!!    Maria Ines Siegel

## 2022-08-23 NOTE — TELEPHONE ENCOUNTER
Urology entry note    Urology discharge instructions from hospital procedure today---    Urology discharge instructions of the patient--  1. Resume daily rivaroxaban on Wednesday, 8/24/2022.    2.  During the next 3 weeks, urine ever becomes bloody, hold the rivaroxaban that day and push various different oral fluids to increase urine output, thereby diluting out any blood and simultaneously stop any strenuous physical activity. Any such bleeding typically would stop within 1 to 12 hours. Plan to restart subsequently rivaroxaban the next day if this were to happen. 3.  When we receive the results of your pathology-microscope analysis of a small bladder tumor removed today, we will either call you or else notify you by means of \"my chart\". 4.  Further recommendations will depend on the above. 5.  For pain, please take Tylenol; if that is not enough, then take hydrocodone-Norco narcotic 1 tablet every 6 hours as needed for severe surgical pain.

## 2022-08-23 NOTE — BRIEF OP NOTE
[facility] POST ANESTHESIA CARE UNIT  Brief Op Note       Patients Name: Adrián Monique  Attending Physician: Claudia Peters MD  Operating Physician: Kevan Roth MD  CSN: [id number]     Location:  OR  MRN: [id number]    YOB: 1940  Admission Date: 8/23/2022  Operation Date: 8/23/2022    Brief Operative Report    Pre-Operative Diagnosis: Bladder tumor [D49.4]; recent history of bladder cancer    Post-Operative Diagnosis:  Bladder tumor [D49.4]; recent history of bladder cancer    Procedure Performed: Cystoscopy, removal and fulguration of bladder tumors, bilateral retrograde pyelogram     Primary Surgeon:  Kevan Roth MD    Assistants: None    Anesthesia: General  Anesthesiologist [facility]: Marco Johnson MD    Findings: Small bladder tumor right trigone    EBL: Less than 1 mL    Complications: None    Specimens:    ID Type Source Tests Collected by Time Destination   1 : 1)small tumor right trigone Tissue Bladder SURGICAL PATHOLOGY TISSUE Claudia Peters MD 8/23/2022  8:21 AM        Condition: Stable      Kevan Roth MD  8/23/2022  9:27 AM

## 2022-08-23 NOTE — ANESTHESIA PROCEDURE NOTES
Airway  Date/Time: 8/23/2022 7:41 AM  Urgency: Elective    Airway not difficult    General Information and Staff    Patient location during procedure: OR  Anesthesiologist: Tc Aguilar MD  Performed: anesthesiologist     Indications and Patient Condition  Indications for airway management: anesthesia  Spontaneous ventilation: present  Sedation level: deep  Preoxygenated: yes  Patient position: sniffing  Mask difficulty assessment: 1 - vent by mask    Final Airway Details  Final airway type: supraglottic airway      Successful airway: classic  Size 3      Number of attempts at approach: 1

## 2022-08-24 NOTE — OPERATIVE REPORT
[facility]    PATIENT'S NAME: Reza Quintana   ATTENDING PHYSICIAN: Claudio Hurt MD   OPERATING PHYSICIAN: Claudio Hurt MD   PATIENT ACCOUNT#:   [id number]    LOCATION:  [address]  MEDICAL RECORD #:   [id number]       YOB: 1940  ADMISSION DATE:       08/23/2022      OPERATION DATE:  08/23/2022    OPERATIVE REPORT    PREOPERATIVE DIAGNOSIS:    1. Recent history of low-grade bladder cancer with recent bladder biopsy showing atypia. 2.   On rivaroxaban. 3.   Atrial fibrillation. 4.   A 50-pack year history of smoking. POSTOPERATIVE DIAGNOSIS:    1.   Small bladder tumor. 2.   Recent history of low-grade bladder cancer with recent bladder biopsy showing atypia. 3.   On rivaroxaban. 4.   Atrial fibrillation. 5.   A 50-pack year history of smoking. PROCEDURE:  Cystoscopy with removal of small bladder tumor (greater than 5 mm) and bilateral retrograde pyelogram x-ray. ASSISTANT:  None. COMPLICATIONS:  None. ESTIMATED BLOOD LOSS:  Less than 1 mL. ANESTHESIA:  General.     INDICATIONS:  On 12/28/2021, cystoscopy with transurethral resection of multiple bladder tumors in different locations with the largest greater than 2 cm. Afterwards the patient chose to undergo mitomycin C instillations, which were administered February through March, for a total of 6 instillations. On 05/11/2022, removal of a bladder tumor showed atypical urothelial mucosa. Patient had just had a cystoscopy in the office on August 17, 2022, and a suspicious bladder tumor was present. Patient now being taken to the operating room. FINDINGS:  The urethra and bladder neck are unremarkable. The bladder is 1 to 2+ trabeculated.   On the right trigone, there are two openings and appears to be two separate orifices on the right side, and that was a new finding and for that reason, bilateral retrograde pyelograms were performed and on the right side, patient actually has a false orifice more distally on the trigone and that false orifice is actually the patient's original orifice, but that whole area is in the area of scar and the patient had a tumor removed from that area and as a result of that, she formed a new ureteral orifice that is more craniad and I demonstrated by ultimately through retrograde pyelogram and by inserting a Tiger catheter through the more caudal opening, that it is a false ureteral orifice. There was a small bladder tumor about 7 mm in size on the right trigone, close, but not involving these orifices and that tumor is removed with serial bites of cold-cut biopsy forceps and then that resulting bed of tissue, which oozed was carefully fulgurated, taking care not to affect the actual right orifice. Both retrograde pyelograms failed to show any filling defects in either ureter or renal collecting system and both sides emptied normally by the end of the case, so there was no obstruction. Upon reviewing the films, right side may appear full, but I purposefully over injected to visualize all of the collecting system, ureter and everything drained out. RECOMMENDATIONS AND TREATMENT PLAN:  Await the results of the pathology. The first and only time that cancer was definitively proven, was on 12/28/2021, hospital procedure. OPERATIVE TECHNIQUE:  The patient was taken to the operating room, placed in the supine position, induced under general anesthesia, placed in the dorsal lithotomy position, prepped and draped in usual sterile fashion. I performed cystoscopy using a Storz 22-Cambodian cystoscope sheath using 30-degree and 70-degree angle lenses and during this procedure, I also used a 10-Cambodian cone tip ureteral catheter and a 5-Cambodian Tiger catheter and I also used angle Glidewire in locating ureteral orifices. Findings as above. To perform retrograde pyelograms, I used Isovue-300 diluted 50/50 with sterile water.   I used fluoroscopy and obtained overhead x-rays. The small bladder tumor described above was removed with cold cup biopsy forceps, serial bites and I used 2 different Bugbee electrodes, a small ball tip and also a cone-type Bugbee. At the end of the case, there was no bleeding. During retrograde pyelograms, overhead pictures were obtained from first performing fluoroscopy. At the end of the case, I instilled 20 mL of 2% Xylocaine gel Uro-Jet into the bladder. The patient was woken from anesthesia. She was stable. She has been taken to the recovery room. My intentions are to call patient's daughter as patient asked me to do.     (Also job [id number])    Dictated By Marianela Franco MD  d: 08/23/2022 08:59:13  t: 08/23/2022 19:55:12  Job [id number]  K/    cc: Sri Irizarry MD

## 2022-08-25 NOTE — TELEPHONE ENCOUNTER
Urology staff,     please call hospital pathology department ASAP. I performed hospital cystoscopy with removal of small bladder tumor 2 days ago; results are still not in epic; I am worried that specimen might of been lost.  When will be specimen be read? Please let me know.   Thank you, Dr. Vaughn Yale

## 2022-09-19 NOTE — TELEPHONE ENCOUNTER
Patient states she is experiencing blood in urine and would like to know if she should get a test done or make an appointment.  Please advise

## 2022-09-21 NOTE — TELEPHONE ENCOUNTER
----- Message from GOLD Ma sent at 9/21/2022 11:03 AM CDT -----  Please let patient know her urine culture shows no growth. Please confirm her hematuria has resolved, if it has resolved we will monitor for now and plan for follow up in November as planned, she recently underwent a cystoscopy with [facility] prior to his alf. IF she is having persistent hematuria please let me know.

## 2022-10-17 NOTE — TELEPHONE ENCOUNTER
Patient states she tried to get an US done and was told she needed another US order place from a provider still in practice.  Please advise

## 2022-10-17 NOTE — TELEPHONE ENCOUNTER
I s/w pt and reminded her that I s/w her a couple week sago to tell her that she did not need to repeat her kidney US as per [facility]'s lov plan on 8/23/22. I did tell her that she will need an appt for consult with one of the urologists so that she can them be schd for her office cysto for BTS in November. We arranged an appt with Dr. Karine Robison for wed.  10/19 at 11:30 am with Dr. Hudson Sanchez.

## 2022-10-31 NOTE — PROCEDURES
CYSTOSCOPY (FEMALE)    PRE-OP DIAGNOSIS: Low-grade TA     POST-OP DIAGNOSIS: Same    PROCEDURE: Cystsocopy    SURGEON: Monalisa Cohen MD    ASSISTANT: none     EBL: minimal    FINDINGS:   1. Urethra: No urethral lesions, no urethral strictures  2. Bladder: Bilateral ureteral orifices in orthotopic position with efflux, no suspicious or concerning erythematous lesions, no papillary bladder tumors, no stones   3. Retroflexion: no abnormalities   4. Other findings: none    INDICATIONS: History of low-grade TA since 2021 status post 6 cycles of Mitomycin-C with no obvious recurrence of cancer. She is due for an office cystoscopy and cytology today. 10/21/2021 cytology - negative  10/28/2021 CTU - no urolithiasis, proteinacious cyst  2021 TURBT - LgTa 2.5cm tumor  Treated with 6 cycles of mitomycin C   2022 - 6mm tumor on left upper posterior wall - NOT CANCEROUS  2022 cytology - negative  2022 - TURBT NO CANCER  10/19/2022- cytology/negative    PROCEDURE: Patient was brought to the procedure suite and a timeout was performed identifying the patient,  and procedure being performed. The risks of the procedure were once again detailed to the patient including bleeding, infection, dysuria. The patient agreed to proceed. The patient had a negative urinalysis. No antibiotics were given to patient prior to this procedure. She was placed in a supine position on the table and a flexible cystoscope was inserted per urethra. There were no obvious urethral lesions or strictures. Once in the bladder we performed a full diagnostic/surveillance cystoscopy which demonstrated no abnormalities. On retroflexion we noted no abnormalities. The scope was then carefully removed and once again no urethral abnormalities were noted. There were no complications after this procedure and the patient tolerated the procedure without issue.     IMPRESSION: History of low-grade TA bladder cancer with negative cystoscopy today. We will plan to see her back again in 3 months for cystoscopy and cytology prior. Cytology has been ordered. PLAN:    1. RTC 3 months for cystoscopy + cytology prior  2.  Patient to start pelvic floor PT by her own volition, I agree with this

## 2022-11-25 NOTE — TELEPHONE ENCOUNTER
Fax received from [facility] for pelvic floor, placed on Dr. Lucero Martin desk then will sent to be scanning.

## 2022-11-28 NOTE — TELEPHONE ENCOUNTER
Per Micaela Olivier they have faxed initial evaluation on 11/14/22, requesting for it to be signed and faxed back to #[phone].  Please advise

## 2022-11-29 NOTE — TELEPHONE ENCOUNTER
Form is SATYA, per Dr. Smith Ramsey \"pt was placed by her other doctors, I am fine with her proceeding\" per Dr. Smith Ramsey.  So I  sent fax to secure fax number

## 2022-12-29 NOTE — TELEPHONE ENCOUNTER
LOV with Dr. Jose Chen was 10/31/22 I copied and pasted your plan below. Not sure why central scheduling is looking for an ultrasound order, last ultrasound of kidney  done 4/15/22 and Dr. Jose Chen did not order another one. I called pt verified name/ informed pt of the results of kidney ultrasound from 4/15/22 by Dr. Taylor Amaya, pt states she doesn't even remember having the test done and or the results, pt is thankful for the clarification. PLAN:    1. RTC 3 months for cystoscopy + cytology prior  2.  Patient to start pelvic floor PT by her own volition, I agree with this

## 2023-03-23 NOTE — TELEPHONE ENCOUNTER
Pt states she is having UTI symptoms - and she is at [address] [place] [address] now - asking if UA order can be sent now

## 2023-03-23 NOTE — TELEPHONE ENCOUNTER
I called pt and kurtisd that she was going to use a standing order for urine testing because she thinks she may have a UTI. I told her that there is a UA and C&S standing orders under [facility] and that I would prefer to make new orders for her today under [facility] since she has already seen her in the office. I asked her about her symptoms and she reported that her urine is cloudy, has floaters tat look like mucous and is foul smelling and she also feels very fatigued and states she had chills last not but does not have a fever. Denies fever, hematuria, dysuria or frequency and urgency. Also denies a HX of kidney stones. I told pt that I will place orders for UA and C&S and told her that she should submit a urine sample at the lab ASAP. I reviewed the past urine cultures and the treatments and her past GFR's and I then initiated the UTI protocol and explained to pt that I will send Cefadroxil 500 mg 1 tab by mouth every 12 hrs for 7 days, # 14, no refills. I asked that she not start the ABX until she submits the urine sample at the lab and that she understands that when we get the final C&S results we may have to change the ABX if the bacteria is resistant to the ABX we prescribe empirically. We will contact her with results in 48 or more hrs. Pt verbalized understanding and compliance.

## 2023-03-24 NOTE — TELEPHONE ENCOUNTER
Per pharmacy cefadroxil is on back order, asking to send prescription for different abx. Please advise thank you.

## 2023-04-24 NOTE — PATIENT INSTRUCTIONS
Patient is to continue her current diet, medication and activity. I will increase patient's Synthroid/levothyroxine to 0.150 mg orally daily. Patient is to push fluids. I will place orders in the system for the patient have blood test performed later this week or next week which will include a CBC, iron, iron-binding capacity, ferritin level, vitamin Y80 level, folic acid and BMP. I will plan to see the patient back in 6 months with other blood tests at that time which I will place in the system after the patient have the above blood test taken.

## 2023-04-25 NOTE — TELEPHONE ENCOUNTER
TUSHAR Saul Dr-Pt's Calcium from my lab on 4/22 was 10.2. I am going to follow up on pt's renal function. Thank you!     Katarina Horton

## 2023-04-25 NOTE — TELEPHONE ENCOUNTER
Endo staff:   Calcium is stable It was check on two different assays and came out as 10.2 on one and 10.6 on the other.  Stable when compared to prior readings  Vitamin D is low but also stable at 23  PTH also high but stable  Please continue with current vitamin D replacement, get sunlight, hydrate well and stay active  Please repeat calcium and vitamin D and PTH in three to four  Months  Creatinine is high-- > please FU with Dr. Griffin Song for that       Dr. Griffin Song : Clint Lizama about the cr    Thanks

## 2023-05-01 NOTE — TELEPHONE ENCOUNTER
Hi!  This patient needs a TURBT. Should be booked for 30 minutes in the next 1-3 weeks. Needs labs as ordered below. Thanks! [facility]    Urology Surgery Scheduling Request    Location: [address] [place] [address] OR    Surgeon: Gilda Singh MD    Asst. Surgeon: N/A    Diagnosis: bladder tumor    Procedure: Cystoscopy transurethral resection of bladder tumor    Anesthesia: General     Time Frame: 1-3 weeks    Time needed: 30 mintues    Special Equipment: Holmium Laser    On Call to OR: Ancef    Admission: Day Surgery    Pre-op Testing: CBC, CMP, PT/INR and Urine Culture     Need Pre-op Clearance: yes, needs to hold xarelto for 2-3 days (PCP clearance)    Estimated Post Op/Follow Up Appt: tbd.     Qian Cifuentes MD

## 2023-05-05 NOTE — TELEPHONE ENCOUNTER
Telephone call to patient to notify her that her anemia is still present but the blood test have shown that it is not due to an iron deficiency. We will continue to monitor this in the future. Patient's BMP did show that the patient's renal function is improved from before. Patient still has chronic kidney disease but her GFR is back up to her normally runs which is about 54. Patient was glad to hear this.

## 2023-05-11 NOTE — TELEPHONE ENCOUNTER
Dear Guillermo Remy, received a call from pre-admit, please take a look at urine culture, pt/inr.  Please advise thanks

## 2023-05-12 NOTE — TELEPHONE ENCOUNTER
Called patient UCx positive. Wrote bactrim x 9 days (up until surgery and then 3 days after)    She will drop off INR day prior. Holding blood thinners 2 days prior.

## 2023-05-17 NOTE — H&P
PRE-OP NOTE     NAME/MRN/PROCEDURE: Josesito Frederick [id number], transurethral resection of bladder tumor  HPI: 57-year-old woman with a history of low-grade TA since 2021 status post 6 cycles of Mitomycin-C with no obvious recurrence of cancer who was noted to have a small erythematous lesion at prior resection site at left base of bladder was counseled on TURBT.   PMH: Bladder cancer, atrial fibrillation, arrhythmia, thyroid disorder, diverticulitis, urine incontinence, osteoarthritis, visual impairment  PSH: , cholecystectomy, colonoscopy, hernia surgery, left needle biopsy  MEDS: Atorvastatin, coenzyme, vitamin B12, diltiazem, Norco, levothyroxine, lisinopril, probiotic, Xarelto, zinc  ALL: Bimatoprost  LABS: Cytology benign, INR 1.67, creatinine 1.14, hematocrit 35.9, platelets 112, urine culture greater than 100,000 E. coli (prescribed Bactrim)  IMAGING: CT urogram in  with punctate hyperdensity in the right renal pelvis, 1 mm, parapelvic cysts    OTHER:   No apparent distress, cooperative and communicative  Nonlabored breathing on room air  Soft, nontender, nondistended, no rebound tenderness, no guarding, no masses  wwp   ABX: Ampicillin and ceftriaxone

## 2023-05-18 NOTE — ANESTHESIA PROCEDURE NOTES
Airway  Date/Time: 5/18/2023 2:13 PM  Urgency: Elective      General Information and Staff    Patient location during procedure: OR  Anesthesiologist: Nikki Henson MD  Performed: anesthesiologist   Performed by: Nikki Henson MD  Authorized by: Nikki Henson MD      Indications and Patient Condition  Indications for airway management: anesthesia  Sedation level: deep  Preoxygenated: yes  Patient position: sniffing  Mask difficulty assessment: 1 - vent by mask    Final Airway Details  Final airway type: supraglottic airway      Successful airway: classic  Size 4       Number of attempts at approach: 1

## 2023-05-18 NOTE — OPERATIVE REPORT
OPERATIVE REPORT  DATE OF SURGERY: 5/18/2023  PREOPERATIVE DIAGNOSIS: Bladder tumor  POSTOPERATIVE DIAGNOSIS: Same  PROCEDURE: Cystoscopy, transurethral resection of bladder tumor 1cm total area  SURGEON: Soumya Spence MD  ASSISTANT: none  ANESTHESIA: General ET tube  FLUIDS: Per anesthesia  URINE OUTPUT: Not applicable  ESTIMATED BLOOD LOSS: 3cc  SPECIMENS: bladder lesion  CONDITION: Stable to PACU    INDICATIONS: 40-year-old woman with a history of low-grade TA since 12/20/2021 status post 6 cycles of Mitomycin-C with no obvious recurrence of cancer who was noted to have a small erythematous lesion at prior resection site at left base of bladder was counseled on TURBT. PROCEDURE: The patient was taken to the operating room and given general anesthesia and then placed in yellowfin stirrups. Patient received ampicillin and ceftriaxone antibiotic coverage before starting the case. At this point, sounds were used to dilate the meatus to [address] [place]. The resectoscope passed easily in the meatus and into the bladder. A full cystoscopy was performed which demonstrated a small erythematous lesion adjacent to prior resection site posterior to the left UO as well as in the trigone. We then used the resectocope loop to resect the bladder tumor in its entirity. We used the loop to fulgurate the area completely. With the bladder decompressed there was no further bleeding. The specimens were removed and sent to pathology. The patient was then awakened and transported to the PACU in good condition. IMPRESSION: Status post TURBT    PLAN:  1. RTC in 1-2 weeks  2. RTC at that time for pathology discussion   3.  Continue antibiotics per my initial instructions

## 2023-07-24 NOTE — TELEPHONE ENCOUNTER
Patient is calling to request an order for a mammogram. Please call when order is placed.       # [phone]

## 2023-07-26 NOTE — TELEPHONE ENCOUNTER
I have reviewed pt's Mammogram report. It has turned out well. It is similar to her previous Mammogram.  No sign of cancer. Please notify pt of this result. I will forward this message to nursing.   Thank you!!

## 2023-07-26 NOTE — TELEPHONE ENCOUNTER
Dr Shan Deras message sent to patient via [address] Mammogram result seen by patient yesterday.

## 2023-08-03 NOTE — PATIENT INSTRUCTIONS
Patient is to continue her current diet, medication and activity. Patient is to continue to follow-up with her consultants as she is doing. I will plan to see the patient back in about 3 months with blood test which will include a CBC, BMP, lipid panel, AST, ALT and TSH and vitamin D. We will see the patient back sooner as necessary.

## 2023-09-05 NOTE — TELEPHONE ENCOUNTER
Spoke with patient and informed her that Dr Jose Morales requested that she has A urine Cytology before visit on 09/8/2023 pt verbalized understating          Encounter Closed

## 2023-09-05 NOTE — TELEPHONE ENCOUNTER
Pt called stating pt is scheduled for cysto on Friday 9-8-23. Should pt have a urine test prior to the cysto.   Please call pt

## 2023-09-08 NOTE — PROCEDURES
CYSTOSCOPY (FEMALE)    PRE-OP DIAGNOSIS: bladder lesions/bladder cancer    POST-OP DIAGNOSIS: same    PROCEDURE: Cystsocopy    SURGEON: Eileen Ambriz MD    ASSISTANT: none     EBL: minimal    FINDINGS:   Urethra: No urethral lesions, no urethral strictures  Bladder: Bilateral ureteral orifices in orthotopic position with efflux, no suspicious or concerning erythematous lesions, no papillary bladder tumors, no stones   Retroflexion: no abnormalities   Other findings: none    INDICATIONS: s/p TURBT with pathology returning benign on 2023 - presents for cystoscopy. Cytology negative. 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 She is due for an office cystoscopy and cytology today. 10/21/2021 cytology - negative  10/28/2021 CTU - no urolithiasis, proteinacious cyst  2021 TURBT - LgTa 2.5cm tumor  Treated with 6 cycles of mitomycin C   2022 - 6mm tumor on left upper posterior wall - NOT CANCEROUS  2022 cytology - negative  2022 - TURBT NO CANCER  10/19/2022- cytology/negative  2023 - negative TURBT    PROCEDURE: Patient was brought to the procedure suite and a timeout was performed identifying the patient,  and procedure being performed. The risks of the procedure were once again detailed to the patient including bleeding, infection, dysuria. The patient agreed to proceed. The patient had a negative urinalysis. No antibiotics were given to patient prior to this procedure. She was placed in a supine position on the table and a flexible cystoscope was inserted per urethra. There were no obvious urethral lesions or strictures. Once in the bladder we performed a full diagnostic/surveillance cystoscopy which demonstrated no abnormalities. On retroflexion we noted no abnormalities. The scope was then carefully removed and once again no urethral abnormalities were noted.     There were no complications after this procedure and the patient tolerated the procedure without issue.     IMPRESSION: cysto negative    PLAN:    RTC cysto in 3 months with cytology prior

## 2023-11-08 ENCOUNTER — OFFICE VISIT (OUTPATIENT)
Dept: INTERNAL MEDICINE CLINIC | Facility: CLINIC | Age: 83
End: 2023-11-08
Payer: MEDICARE

## 2023-11-08 VITALS
DIASTOLIC BLOOD PRESSURE: 86 MMHG | SYSTOLIC BLOOD PRESSURE: 136 MMHG | WEIGHT: 189.13 LBS | HEART RATE: 72 BPM | HEIGHT: 63 IN | OXYGEN SATURATION: 98 % | BODY MASS INDEX: 33.51 KG/M2

## 2023-11-08 DIAGNOSIS — R53.83 FATIGUE, UNSPECIFIED TYPE: ICD-10-CM

## 2023-11-08 DIAGNOSIS — N18.31 STAGE 3A CHRONIC KIDNEY DISEASE (HCC): ICD-10-CM

## 2023-11-08 DIAGNOSIS — E78.1 HYPERTRIGLYCERIDEMIA: ICD-10-CM

## 2023-11-08 DIAGNOSIS — K57.30 DIVERTICULOSIS OF LARGE INTESTINE WITHOUT HEMORRHAGE: ICD-10-CM

## 2023-11-08 DIAGNOSIS — R73.01 ELEVATED FASTING GLUCOSE: ICD-10-CM

## 2023-11-08 DIAGNOSIS — E78.00 HYPERCHOLESTEROLEMIA: ICD-10-CM

## 2023-11-08 DIAGNOSIS — I48.21 PERMANENT ATRIAL FIBRILLATION (HCC): ICD-10-CM

## 2023-11-08 DIAGNOSIS — M15.9 PRIMARY OSTEOARTHRITIS INVOLVING MULTIPLE JOINTS: ICD-10-CM

## 2023-11-08 DIAGNOSIS — D64.9 ANEMIA, UNSPECIFIED TYPE: ICD-10-CM

## 2023-11-08 DIAGNOSIS — E21.3 HYPERPARATHYROIDISM (HCC): ICD-10-CM

## 2023-11-08 DIAGNOSIS — E55.9 VITAMIN D DEFICIENCY: ICD-10-CM

## 2023-11-08 DIAGNOSIS — I10 ESSENTIAL HYPERTENSION: Primary | ICD-10-CM

## 2023-11-08 DIAGNOSIS — Z00.00 ANNUAL PHYSICAL EXAM: ICD-10-CM

## 2023-11-08 DIAGNOSIS — C67.9 MALIGNANT NEOPLASM OF URINARY BLADDER, UNSPECIFIED SITE (HCC): ICD-10-CM

## 2023-11-08 DIAGNOSIS — R73.01 ABNORMAL FASTING GLUCOSE: ICD-10-CM

## 2023-11-08 DIAGNOSIS — E03.9 HYPOTHYROIDISM, UNSPECIFIED TYPE: ICD-10-CM

## 2023-11-08 PROCEDURE — 99214 OFFICE O/P EST MOD 30 MIN: CPT | Performed by: INTERNAL MEDICINE

## 2023-11-08 RX ORDER — LEVOTHYROXINE SODIUM 0.15 MG/1
TABLET ORAL
Qty: 90 TABLET | Refills: 3 | Status: SHIPPED | OUTPATIENT
Start: 2023-11-08

## 2023-11-08 RX ORDER — LEVOTHYROXINE SODIUM 137 UG/1
TABLET ORAL
Qty: 40 TABLET | Refills: 3 | Status: SHIPPED | OUTPATIENT
Start: 2023-11-08

## 2023-11-08 NOTE — PATIENT INSTRUCTIONS
Patient is to continue her current diet, medication and activity. Patient will have her flu vaccine with her son, who is the physician. I have encouraged the patient to get her COVID-vaccine at her pharmacy. I will plan to see the patient back in about 5 months, in April, with blood tests and urinalysis for her annual physical examination. Blood test will include a CBC, CMP, lipid panel, TSH and vitamin D level. I will see the patient back sooner as necessary. We will decrease her Synthroid/levothyroxine dose as follows. Patient will take Synthroid/levothyroxine 0.150 mg orally on Tuesday, Thursday, Saturday and Sunday. She will then take Synthroid/levothyroxine 0.137 mg orally on Monday, Wednesday and Friday.

## 2023-11-16 RX ORDER — DILTIAZEM HYDROCHLORIDE 120 MG/1
120 CAPSULE, EXTENDED RELEASE ORAL DAILY
Qty: 90 CAPSULE | Refills: 3 | Status: SHIPPED | OUTPATIENT
Start: 2023-11-16

## 2023-11-16 NOTE — TELEPHONE ENCOUNTER
To Brandi Molina for consult last RX from 2021 - per [place]  pt has been getting this from Dr. Tariq Verma - are we sending RX?

## 2023-11-16 NOTE — TELEPHONE ENCOUNTER
Our last Rx was from 11/2022  Refill request is for a maintenance medication and has met the criteria specified in the Ambulatory Medication Refill Standing Order for eligibility, visits, laboratory, alerts and was sent to the requested pharmacy.     Requested Prescriptions     Signed Prescriptions Disp Refills    dilTIAZem ER (CARTIA XT) 120 MG Oral Capsule SR 24 Hr 90 capsule 3     Sig: TAKE ONE CAPSULE BY MOUTH ONE TIME DAILY     Authorizing Provider: Stanton Hollingsworth     Ordering User: Shamir Woody

## 2023-12-01 ENCOUNTER — LAB ENCOUNTER (OUTPATIENT)
Dept: LAB | Facility: HOSPITAL | Age: 83
End: 2023-12-01
Attending: UROLOGY

## 2023-12-01 LAB
BILIRUB UR QL: NEGATIVE
CLARITY UR: CLEAR
GLUCOSE UR-MCNC: NORMAL MG/DL
HYALINE CASTS #/AREA URNS AUTO: PRESENT /LPF
KETONES UR-MCNC: NEGATIVE MG/DL
LEUKOCYTE ESTERASE UR QL STRIP.AUTO: 250
NITRITE UR QL STRIP.AUTO: NEGATIVE
PH UR: 5.5 [PH] (ref 5–8)
RBC #/AREA URNS AUTO: >10 /HPF
SP GR UR STRIP: 1.02 (ref 1–1.03)
UROBILINOGEN UR STRIP-ACNC: NORMAL
WBC #/AREA URNS AUTO: >50 /HPF
WBC CLUMPS UR QL AUTO: PRESENT /HPF

## 2023-12-01 NOTE — TELEPHONE ENCOUNTER
To Dr Katie Wakefield can you please review for Dr FAJARDO patient    Please see also documentation below    I attempted to reach Dr Barrios's office. A message was given to their office and was told a nurse would call us back, have not received call back  yet.     Patient denies any urinary complaints at this time, I advised that she let us know if she develops any   She is supposed to have a cysto on Monday with Dr Antonio Wiley

## 2023-12-01 NOTE — TELEPHONE ENCOUNTER
Abnormal urinalysis seen. Urine culture pending. Asymptomatic clinical status noted. She has follow-up with Dr. Devine cystoscope Monday. It looks like message was sent to Dr. Jp Borja for her to review. For now no further action needed.

## 2023-12-01 NOTE — TELEPHONE ENCOUNTER
[facility] calling from Dr Fred Simeon office   Regarding urine test results  please advise

## 2023-12-01 NOTE — TELEPHONE ENCOUNTER
I spoke to Tamera De Souza in regards to slight abnormalities on her UA  She denies any urinary complaints at this time. Pt says she thinks Dr Florentino Forrest was the one who had wanted her to do the urine testing  The urine testing was ordered under Dr FAJARDO, and I do not see that it was sent to Dr Florentino Forrest. She says she is supposed to have a cystoscopy on Monday with Dr Florentino Forrest. I called Dr Osvaldo Odom office [phone]  I was told that a message would be sent to the nurse and they will call us back. When they call back need to let them know that patient had  UA done today with slight abnormalities, urine culture is still pending. She is supposed to have a cystoscopy on Monday. Would results of the UA affect that procedure? Also, it appears that patient was maybe not supposed to have the UA done from Dr David Balderas as when looking back in the chart, patient has cytology done before her cystocopies and there is an outstanding order for cytology from 9/8/23 ordered by Dr Florentino Forrest. Was patient supposed to have cytology done and not UA?

## 2023-12-04 ENCOUNTER — PROCEDURE (OUTPATIENT)
Dept: SURGERY | Facility: CLINIC | Age: 83
End: 2023-12-04
Payer: MEDICARE

## 2023-12-04 VITALS — DIASTOLIC BLOOD PRESSURE: 76 MMHG | HEART RATE: 77 BPM | SYSTOLIC BLOOD PRESSURE: 130 MMHG

## 2023-12-04 DIAGNOSIS — D49.4 BLADDER TUMOR: Primary | ICD-10-CM

## 2023-12-04 PROCEDURE — 52000 CYSTOURETHROSCOPY: CPT | Performed by: UROLOGY

## 2023-12-04 NOTE — PROCEDURES
CYSTOSCOPY (FEMALE)     PRE-OP DIAGNOSIS: bladder lesions/bladder cancer     POST-OP DIAGNOSIS: same     PROCEDURE: Cystsocopy     SURGEON: Mesha Butler MD     ASSISTANT: none      EBL: minimal     FINDINGS:   Urethra: No urethral lesions, no urethral strictures  Bladder: Bilateral ureteral orifices in orthotopic position with efflux, no suspicious or concerning erythematous lesions, no papillary bladder tumors, no stones   Retroflexion: no abnormalities   Other findings: none     INDICATIONS: s/p TURBT with pathology returning benign on 2023 - presents for cystoscopy. Cytology negative. 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 She is due for an office cystoscopy and cytology today. 10/21/2021 cytology - negative  10/28/2021 CTU - no urolithiasis, proteinacious cyst  2021 TURBT - LgTa 2.5cm tumor  Treated with 6 cycles of mitomycin C   2022 - 6mm tumor on left upper posterior wall - NOT CANCEROUS  2022 cytology - negative  2022 - TURBT NO CANCER  10/19/2022- cytology/negative  2023 - negative TURBT     PROCEDURE: Patient was brought to the procedure suite and a timeout was performed identifying the patient,  and procedure being performed. The risks of the procedure were once again detailed to the patient including bleeding, infection, dysuria. The patient agreed to proceed. The patient had a negative urinalysis. No antibiotics were given to patient prior to this procedure. She was placed in a supine position on the table and a flexible cystoscope was inserted per urethra. There were no obvious urethral lesions or strictures. Once in the bladder we performed a full diagnostic/surveillance cystoscopy which demonstrated no abnormalities. On retroflexion we noted no abnormalities. The scope was then carefully removed and once again no urethral abnormalities were noted.     There were no complications after this procedure and the patient tolerated the procedure without issue.     IMPRESSION: cysto negative     PLAN:    RTC cysto in 6 months with cytology prior

## 2023-12-05 LAB — NON GYNE INTERPRETATION: NEGATIVE

## 2023-12-08 NOTE — TELEPHONE ENCOUNTER
Telephone call to pt. Pt's Recent U/A and Urine C+S show pt to have an E. Coli UTI. I have sent a prescription for Keflex 500 mg BID for 10 days to pt's pharmacy. I have also placed an order for a repeat U/A with Culture reflex in the system for a repeat to be done after she completes her course of antibiotics.

## 2024-03-05 NOTE — TELEPHONE ENCOUNTER
Refill request is for a maintenance medication and has met the criteria specified in the Ambulatory Medication Refill Standing Order for eligibility, visits, laboratory, alerts and was sent to the requested pharmacy.    Requested Prescriptions     Signed Prescriptions Disp Refills    atorvastatin 10 MG Oral Tab 90 tablet 3     Sig: TAKE ONE TABLET BY MOUTH NIGHTLY     Authorizing Provider: GRAHAM MESA     Ordering User: KIRILL DE LA CRUZ

## 2024-04-08 NOTE — TELEPHONE ENCOUNTER
7. Hypothyroidism, unspecified type  Stable.  CPM.  Patient's recent TSH was 0.254 which indicates patient has too much thyroid in her system.  I will decrease patient's Synthroid dose and have her take Synthroid/levothyroxine 0.137 mg orally on Monday, Wednesday and Friday.  She will take Synthroid/levothyroxine 0.150 mg orally on Tuesday, Thursday, Saturday and .       Refill request is for a maintenance medication and has met the criteria specified in the Ambulatory Medication Refill Standing Order for eligibility, visits, laboratory, alerts and was sent to the requested pharmacy.    Requested Prescriptions     Signed Prescriptions Disp Refills    levothyroxine 150 MCG Oral Tab 90 tablet 2     Si tablet orally on Tuesday, Thursday, Saturday and .     Authorizing Provider: GRAHAM MESA     Ordering User: TRACY SEBASTIAN

## 2024-04-18 NOTE — TELEPHONE ENCOUNTER
Dr. Carvajal,     Patient would like to know what labs she needs to have done prior to her upcoming appointment.     Please call patient.     Thank you

## 2024-04-18 NOTE — TELEPHONE ENCOUNTER
Called pt and notified that labs are placed and can be done prior to appt. No fasting is required. No further questions at this time.

## 2024-04-18 NOTE — TELEPHONE ENCOUNTER
Dr. Carvajal,  Patient has f/u on 4/23/24 for Primary hyperparathyroidism and vitamin D deficiency    Calcium lab order active from 10/3/23 - please advise on additional lab orders -thanks

## 2024-04-23 NOTE — PROGRESS NOTES
Return Office Visit     CHIEF COMPLAINT:     Primary Hyperparathyroidism    HISTORY OF PRESENT ILLNESS:  Jeannette Austin is a 83 year old female who presents for follow up for  evaluation of hyperparathyroidism.   Noted to have calcium elevation for the last few years. Highest calcium 11 ( ULN 10.1)  PTH elevated   Has CKD  Vit D deficiency: 500 units daily     She is not on any calcium supplementation  No renal stones.   DXA scan( 4/2023) shows normal BMD        + constipation , miralax helps  No acid reflux  No falls no fractures  No bone pain, no memory changes     She is not aware of any FH of problems.          CURRENT MEDICATION:    Current Outpatient Medications   Medication Sig Dispense Refill    levothyroxine 150 MCG Oral Tab 1 tablet orally on Tuesday, Thursday, Saturday and Sunday. 90 tablet 2    atorvastatin 10 MG Oral Tab TAKE ONE TABLET BY MOUTH NIGHTLY 90 tablet 3    dilTIAZem ER (CARTIA XT) 120 MG Oral Capsule SR 24 Hr TAKE ONE CAPSULE BY MOUTH ONE TIME DAILY 90 capsule 3    levothyroxine (SYNTHROID) 137 MCG Oral Tab 1 tablet orally on Monday, Wednesday and Friday. 40 tablet 3    lisinopril 10 MG Oral Tab Take 1 tablet (10 mg total) by mouth every morning. 90 tablet 3    acetaminophen 500 MG Oral Tab Take 1 tablet (500 mg total) by mouth every 6 (six) hours as needed for Pain.      Cyanocobalamin (VITAMIN B 12 OR) Take 1 capsule by mouth every morning.      Coenzyme Q10 (COQ10) 100 MG Oral Cap Take 1 capsule by mouth every morning.      Zinc 50 MG Oral Tab Take by mouth daily.      Rivaroxaban 20 MG Oral Tab Take 1 tablet (20 mg total) by mouth daily with food.      Probiotic Oral Cap Take 1 capsule by mouth daily. take 1 tab daily            ALLERGY:  Allergies   Allergen Reactions    Bimatoprost OTHER (SEE COMMENTS)     Eye redness       PAST MEDICAL, SOCIAL AND FAMILY HISTORY:  See past medical history marked as reviewed.  See past surgical history marked as reviewed.  See past family history  marked as reviewed.  See past social history marked as reviewed.    ASSESSMENTS:     REVIEW OF SYSTEMS:  Constitutional: Negative for: weight change, fever, fatigue, cold/heat intolerance  Eyes: Negative for:  Visual changes, proptosis, blurring  ENT: Negative for:  dysphagia, neck swelling, dysphonia  Respiratory: Negative for:  dyspnea, cough  Cardiovascular: Negative for:  chest pain, palpitations, orthopnea  GI: Negative for:  abdominal pain, nausea, vomiting, diarrhea, + constipation, bleeding  Neurology: Negative for: headache, numbness, weakness  Genito-Urinary: Negative for: dysuria, frequency  Psychiatric: Negative for:  depression, anxiety  Hematology/Lymphatics: Negative for: bruising, lower extremity edema  Endocrine: Negative for: polyuria, polydypsia  Skin: Negative for: rash, blister, cellulitis,       PHYSICAL EXAM:   Vitals:    04/23/24 1241   BP: 112/68   Pulse: 87   Weight: 189 lb (85.7 kg)   Height: 5' 3\" (1.6 m)     BMI: Body mass index is 33.48 kg/m².         General Appearance:  alert, well developed, in no acute distress  Head: Atraumatic  Eyes:  normal conjunctivae, sclera., normal sclera and normal pupils  Throat/Neck: normal sound to voice. Normal hearing, normal speech  Respiratory:  Speaking in full sentences, non-labored. no increased work of breathing, no audible wheezing    Neuro: motor grossly intact, moving all extremities without difficulty  Psychiatric:  oriented to time, self, and place  Extremities: no obvious extremity swelling, no lesions      DATA:     Pertinent data reviewed    ASSESSMENT AND PLAN:      Patient is a 83 year old female with primary Hyperparathyroidism.   No history of being on thiazides, lithium glucocorticoids, teriparatide.   No history of excessive milk/calcium consumption. No history of immobilization.  -    TSH is normal. No symptoms or signs of adrenal insufficiency or acromegaly.  The patient has primary hyperparathyroidism.  DXA normal 2023  Based on  CKD, she does meet criteria for surgical management.   I have explained this in detail to the patient.   After a discussion, she will like to monitor labs at this time.   Recent calcium is 10.8  Vitamin D is low, but over 20. Will c/w 500 units daily . Get sunlight daily.     PLAN:    - Repeat labs in 3 months  - Continue with vitamin D 500 units for now. Increase sun exposure and vitamin D fortified milk and fish intake  - avoid any calcium containing supplements  - Reviewed symptoms of high calcium , knows to call if she experiences any   - Adequate hydration with atleast 6-8 glasses of water daily  - Patient instructed to avoid factors that cam exacerbate hypercalcemia including thiazide diuretics, dehydration, prolonged bed rest.        Instructed that she shouldn't restrict calcium in her diet, since low calcium levels can increase PTH levels which could cause bone loss. Discussed RDI.       Patient verbalized a complete  understanding of all of the above and did not have any further questions.        RTC in 10 months  Repeat labs in 3 months      Orders Placed This Encounter   Procedures    PTH, Intact    Vitamin D [E]    Calcium         Luisa Carvajal MD

## 2024-04-29 NOTE — TELEPHONE ENCOUNTER
Patient is calling she is scheduled on 5/9 for a Medicare Annual    Patient is requesting an order for labs to be entered in the system

## 2024-04-30 NOTE — TELEPHONE ENCOUNTER
Please notify patient that I have placed orders in the system to be done prior to her examination next week.  I will forward this message to nursing.  Thank you!!

## 2024-05-09 NOTE — PROGRESS NOTES
HPI:   Jeannette Austin is a 83 year old female who who was seen by me on May 9, 2024 for her Medicare annual physical examination.  At the time of examination Mrs. Austin was feeling well.   She notes that she is helping her daughter who has cancer.  Patient's daughter has a neuroendocrine tumor involving her liver and patient brings her for her treatments when she needs to go.  Her daughter lives nearby to where the patient lives.  Patient is also the primary caregiver for her  who unfortunately has Alzheimer's disease.  Patient feels well.  She does have some fatigue.  She has no complaints of chest pain, shortness of breath or palpitations.  She notes that she does have some urinary incontinence.  She does get up frequently at night to urinate.    Wt Readings from Last 6 Encounters:   05/09/24 190 lb (86.2 kg)   04/23/24 189 lb (85.7 kg)   11/08/23 189 lb 2 oz (85.8 kg)   08/03/23 184 lb 12.8 oz (83.8 kg)   06/23/23 187 lb (84.8 kg)   05/16/23 187 lb (84.8 kg)     Body mass index is 34.75 kg/m².       Current Outpatient Medications   Medication Sig Dispense Refill    cephalexin (KEFLEX) 500 MG Oral Cap Take 1 capsule (500 mg total) by mouth 2 (two) times daily for 10 days. 20 capsule 0    levothyroxine 150 MCG Oral Tab 1 tablet orally on Tuesday, Thursday, Saturday and Sunday. 90 tablet 2    atorvastatin 10 MG Oral Tab TAKE ONE TABLET BY MOUTH NIGHTLY 90 tablet 3    dilTIAZem ER (CARTIA XT) 120 MG Oral Capsule SR 24 Hr TAKE ONE CAPSULE BY MOUTH ONE TIME DAILY 90 capsule 3    levothyroxine (SYNTHROID) 137 MCG Oral Tab 1 tablet orally on Monday, Wednesday and Friday. 40 tablet 3    lisinopril 10 MG Oral Tab Take 1 tablet (10 mg total) by mouth every morning. 90 tablet 3    acetaminophen 500 MG Oral Tab Take 1 tablet (500 mg total) by mouth every 6 (six) hours as needed for Pain.      Cyanocobalamin (VITAMIN B 12 OR) Take 1 capsule by mouth every morning.      Coenzyme Q10 (COQ10) 100 MG Oral Cap Take  1 capsule by mouth every morning.      Zinc 50 MG Oral Tab Take by mouth daily.      Rivaroxaban 20 MG Oral Tab Take 1 tablet (20 mg total) by mouth daily with food.      Probiotic Oral Cap Take 1 capsule by mouth daily. take 1 tab daily         Past Medical History:    A-fib (HCC)    Anxiety state    Arrhythmia    A Fib    Blood disorder    Anemia    Cancer (HCC)    bladder     Depression    Disorder of thyroid    Diverticulosis of large intestine    Glaucoma    Hearing impairment    radha hearing aids    High blood pressure    High cholesterol    Incontinence    urine    Visual impairment    readers      Past Surgical History:   Procedure Laterality Date          x 5    Cholecystectomy      Colonoscopy      Hernia surgery      Needle biopsy left      done over 20yrs ago. benign    Other surgical history  2023    cystoscopy      Family History   Problem Relation Age of Onset    Heart Disease Father 60        CAD    Cancer Daughter         THYROID    Other (Other) Daughter         liver cancer    No Known Problems Daughter     Cancer Son         SPIRAL CELL SARCOMA    No Known Problems Son     Other (Other) Son         Interstitial Pneumonia    Uterine Cancer Maternal Grandmother 97    No Known Problems Maternal Grandfather     No Known Problems Paternal Grandmother     No Known Problems Paternal Grandfather     No Known Problems Brother     No Known Problems Brother     No Known Problems Brother     Breast Cancer Maternal Aunt 75      Social History:   Social History     Socioeconomic History    Marital status:    Tobacco Use    Smoking status: Former     Current packs/day: 0.00     Average packs/day: 1 pack/day for 50.0 years (50.0 ttl pk-yrs)     Types: Cigarettes     Start date: 1950     Quit date: 2000     Years since quittin.7    Smokeless tobacco: Never   Vaping Use    Vaping status: Never Used   Substance and Sexual Activity    Alcohol use: Not Currently     Alcohol/week:  3.0 standard drinks of alcohol     Types: 2 Glasses of wine, 1 Shots of liquor per week     Comment: occasional    Drug use: No   Other Topics Concern    Pt has a pacemaker No    Pt has a defibrillator No    Reaction to local anesthetic No          REVIEW OF SYSTEMS:   GENERAL: feels well   SKIN: denies any unusual skin lesions  EYES:denies blurred vision or double vision  HEENT: denies nasal congestion, sinus pain or ST  LUNGS: denies shortness of breath or cough  CARDIOVASCULAR: denies chest pain, pressure, or palpitations  GI: denies abdominal pain, nausea, vomiting, diarrhea, constipation, hematochezia, or melena  :No urinary complaints  NEURO: denies headaches or dizziness    EXAM:   /84 (BP Location: Right arm, Patient Position: Sitting, Cuff Size: large)   Pulse 60   Temp 97.9 °F (36.6 °C) (Oral)   Ht 5' 2\" (1.575 m)   Wt 190 lb (86.2 kg)   BMI 34.75 kg/m²     GENERAL: well developed, well nourished,in no apparent distress  SKIN: no rashes,no suspicious lesions  HEENT: atraumatic, normocephalic, normal oropharynx, normal TM's  EYES:PERRLA, EOMI,conjunctiva are clear, sclerae are nonicteric  NECK: supple, no cervical or supraclavicular LAD, no carotid bruits, no JVD  CHEST: no chest tenderness  BREAST: no dominant or suspicious mass, no axillary LAD.  Patient's breasts appear normal.  No breast masses are noted.  LUNGS: clear to auscultation  CARDIO: RRR, normal S1S2, no murmurs  GI: Protuberant, BS are present, no masses or organomegaly  MUSCULOSKELETAL: back is not tender, no pain or swelling in her legs  EXTREMITIES: no edema, all pulses are intact  NEURO; Alert and oriented, CN are intact, DTRs are 1+ bilaterally    Patient did not have an EKG performed as she has her EKGs with her cardiologist.    Patient CBC is normal.  Patient's CMP had a calcium of 10.9.  The CMP was otherwise normal.  Patient's total bilirubin was 1.3.  Patient's urinalysis had 2+ nitrates.  Urinalysis also 11-20 WBCs.   Urine culture showed there to be between 50-99,000 colonies of E. Coli which is susceptible to numerous antibiotics.  Patient's lipid panel had a cholesterol of 131, triglycerides were 99, HDL cholesterol is 51 and LDL cholesterol was 62.  Patient's FBS was 97 and hemoglobin A1c was 5.9.  Patient's TSH was 1.257.  Patient's vitamin D was 31.9.      ASSESSMENT AND PLAN:   1. Annual physical exam  Patient appears to be doing well at this time.  Patient does appear to have a urinary tract infection.  Patient's urine culture showed her to be between 50,000-99,000 and E. coli.  I will place the patient on Keflex 500 mg orally twice daily for 10 days.  I will obtain a urinalysis with urine culture reflex after he completes the course of antibiotics.  I have encouraged the patient to get her flu shot and COVID shot this autumn, October or November.  I have also asked the patient to check with her pharmacy to see if she did have the second Shingrix vaccine.  I will have the patient return in about 6 months to see Dr Silva. At that time patient will have blood tests which will include a BMP, hemoglobin A1c, lipid panel, AST, ALT and a urinalysis with urine culture reflex.  Patient return sooner as necessary.    2. Essential hypertension  Patient's blood pressure is doing well.  CPM.    3. Malignant neoplasm of urinary bladder, unspecified site (HCC)  Patient does have a history of bladder cancer.  Patient's recent urine culture showed patient have a bladder infection.  Patient culture showed there to be between 50-99,000 of E. coli which was susceptible to numerous antibiotics.  I have placed the patient on Keflex 500 mg orally twice daily for 10 days.  Patient will get a repeat urinalysis with urine culture reflex when she finishes her course of antibiotic.    4. Permanent atrial fibrillation (HCC)  Stable.  CPM.  Patient follows with Dr. Gigi Pierre from cardiology.    5. Stage 3a chronic kidney disease (HCC)  Stable.  CPM  patient's recent BMP had a BUN of 13, creatinine is 0.92 and a GFR of 61.  Doing well.  CPM.    - Basic Metabolic Panel (8); Future    6. Hypercholesterolemia  Doing well.  CPM.  Patient's recent lipid panel had a cholesterol 131, triglycerides were 99, HDL cholesterol is 51 and LDL cholesterol was 62.  Patient's AST was 24 and ALT was 17.  CPM.  Patient be seen back in 6 months with blood test which will include a lipid panel, AST and ALT.    - Lipid Panel; Future  - AST (SGOT) [E]; Future  - ALT(SGPT) [E]; Future    7. Hypertriglyceridemia  Patient's recent triglyceride reading was doing well at 99.  CPM.    - Lipid Panel; Future  - AST (SGOT) [E]; Future  - ALT(SGPT) [E]; Future    8. Elevated fasting glucose  Patient's recent FBS was noted to be 97.  Patient had hemoglobin A1c of 5.9.  Patient is doing well at this time.    - Basic Metabolic Panel (8); Future  - Hemoglobin A1C; Future    9. Hyperparathyroidism (HCC)  Stable.  CPM.  Patient's recent calcium was 10.9.  Patient does follow-up with Dr. Carvajal regarding her hyperparathyroidism.    - Basic Metabolic Panel (8); Future    10. Primary osteoarthritis involving multiple joints  Stable.  CPM.    11. Vitamin D deficiency  Stable.  CPM.  Patient's vitamin D level is 31.9.    12. Diverticulosis of large intestine without hemorrhage  Stable.  CPM.    13. Fatigue, unspecified type  Doing well.  CPM.    14. Acute cystitis without hematuria  Patient was found to have a urinary tract infection with a urine culture showing between 50,090 9000 colonies of E. coli.  The organism was susceptible to numerous antibiotics.  I placed the patient on Keflex 500 mg orally twice a day for 10 days.  I will obtain a follow-up urinalysis with urine culture reflex after the patient completes the course of antibiotics.    - cephalexin (KEFLEX) 500 MG Oral Cap; Take 1 capsule (500 mg total) by mouth 2 (two) times daily for 10 days.  Dispense: 20 capsule; Refill: 0  - Urinalysis  with Culture Reflex; Future      Selvin Grant MD  5/9/2024  6:38 PM

## 2024-05-09 NOTE — PATIENT INSTRUCTIONS
Patient is to continue her current diet, medication and activity.  I will send a prescription for Keflex 500 mg twice a day for 10 days to her pharmacy to treat her current urinary tract infection.    I will place an order in the system for the patient to have a follow-up urinalysis with urine culture reflex after she completes her course of antibiotics.  I will encourage the patient to get her flu vaccine and COVID-vaccine this autumn, in October or November.  I will plan to have the patient return in about 6 months with blood tests which will include a BMP, hemoglobin A1c, lipid panel, AST, and ALT.  Patient is to get her Shingrix vaccine at her pharmacy.

## 2024-06-03 NOTE — TELEPHONE ENCOUNTER
Refill request is for a maintenance medication and has met the criteria specified in the Ambulatory Medication Refill Standing Order for eligibility, visits, laboratory, alerts and was sent to the requested pharmacy.    Requested Prescriptions     Signed Prescriptions Disp Refills    lisinopril 10 MG Oral Tab 90 tablet 3     Sig: TAKE ONE TABLET BY MOUTH EVERY MORNING     Authorizing Provider: GRAHAM MESA     Ordering User: SOLO VAUGHN

## 2024-06-04 NOTE — PROCEDURES
CYSTOSCOPY (FEMALE)     PRE-OP DIAGNOSIS: bladder lesions/bladder cancer     POST-OP DIAGNOSIS: same     PROCEDURE: Cystsocopy     SURGEON: Regi Barrios MD     ASSISTANT: none      EBL: minimal     FINDINGS:   Urethra: No urethral lesions, no urethral strictures  Bladder: Bilateral ureteral orifices in orthotopic position with efflux, no suspicious or concerning erythematous lesions, no papillary bladder tumors, no stones   Retroflexion: no abnormalities   Other findings: none     INDICATIONS: s/p TURBT with pathology returning benign on 2023 - presents for cystoscopy. Cytology pending.     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  She is due for an office cystoscopy and cytology today.     10/21/2021 cytology - negative  10/28/2021 CTU - no urolithiasis, proteinacious cyst  2021 TURBT - LgTa 2.5cm tumor  Treated with 6 cycles of mitomycin C   2022 - 6mm tumor on left upper posterior wall - NOT CANCEROUS  2022 cytology - negative  2022 - TURBT NO CANCER  10/19/2022- cytology/negative  2023 - negative TURBT     PROCEDURE: Patient was brought to the procedure suite and a timeout was performed identifying the patient,  and procedure being performed.  The risks of the procedure were once again detailed to the patient including bleeding, infection, dysuria.  The patient agreed to proceed.  The patient had a negative urinalysis. No antibiotics were given to patient prior to this procedure.      She was placed in a supine position on the table and a flexible cystoscope was inserted per urethra.  There were no obvious urethral lesions or strictures. Once in the bladder we performed a full diagnostic/surveillance cystoscopy which demonstrated no abnormalities.  On retroflexion we noted no abnormalities.  The scope was then carefully removed and once again no urethral abnormalities were noted.    There were no  complications after this procedure and the patient tolerated the procedure without issue.    IMPRESSION: cysto negative     PLAN:    RTC cysto in 6 months with cytology prior

## 2024-06-26 NOTE — TELEPHONE ENCOUNTER
Phone call to patient to notify her that her recent lab tests have turned out well.  I will place orders in the system for her to have done prior to seeing Dr. Silva in the future.

## 2024-12-03 NOTE — TELEPHONE ENCOUNTER
Becki, patient registration, [facility] calling for patient who is there to complete labs, but has no order. I will try rn line, otherwise please update at [phone],thanks.
Ordered enter for cytology fluids. Lab aware.
No/Unable to asses

## 2025-01-06 NOTE — PROCEDURES
CYSTOSCOPY (FEMALE)    PRE-OP DIAGNOSIS: bladder lesion/bladder cancer    POST-OP DIAGNOSIS: same    PROCEDURE: Cystsocopy    SURGEON: Regi Barrios MD    ASSISTANT: none     EBL: minimal    FINDINGS:   Urethra: No urethral lesions, no urethral strictures  Bladder: Bilateral ureteral orifices in orthotopic position with efflux, no suspicious or concerning erythematous lesions, no papillary bladder tumors, no stones   Retroflexion: no abnormalities   Other findings: none    INDICATIONS: /p TURBT with pathology returning benign on 2023 - presents for cystoscopy.     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  She is due for an office cystoscopy and cytology today.     10/21/2021 cytology - negative  10/28/2021 CTU - no urolithiasis, proteinacious cyst  2021 TURBT - LgTa 2.5cm tumor  Treated with 6 cycles of mitomycin C   2022 - 6mm tumor on left upper posterior wall - NOT CANCEROUS  2022 cytology - negative  2022 - TURBT NO CANCER  10/19/2022- cytology/negative  2023 - negative TURBT  2024 - negative    PROCEDURE: Patient was brought to the procedure suite and a timeout was performed identifying the patient,  and procedure being performed.  The risks of the procedure were once again detailed to the patient including bleeding, infection, dysuria.  The patient agreed to proceed.  The patient had a negative urinalysis.  No antibiotics were given to patient prior to this procedure.     She was placed in a supine position on the table and a flexible cystoscope was inserted per urethra.  There were no obvious urethral lesions or strictures. Once in the bladder we performed a full diagnostic/surveillance cystoscopy which demonstrated no abnormalities.  On retroflexion we noted no abnormalities.  The scope was then carefully removed and once again no urethral abnormalities were noted.    There were no  complications after this procedure and the patient tolerated the procedure without issue.    IMPRESSION: cysto negative. Cytology pending.    PLAN:    Cystoscopy 6 months  Cytology prior

## 2025-01-31 NOTE — PROGRESS NOTES
Chief Complaint:   Chief Complaint   Patient presents with    Establish Care     New pt here to establish care with new pcp including 6 month f/u        HPI:     Ms. DELACRUZ is a 84 year old female PMHX permanent atrial fibrillation, hypothyroidism, stage III CKD, hyperlipidemia, hypertension, hyperparathyroidism coming in for follow-up.    Feeling OK. No pain today. At night she has a R wrist and legs with numbness or tingling. The hand feels like it is falling asleep.  No particular way she sleeps, she sleeps all over on her back, sides.  Feels it in the morning but subsides and does not impact her usual activities.  Not painful at all.    Tylenol, rarely.  Avoids NSAIDs as she is on Xarelto.  Overall feeling well otherwise.    Eats more quick foods. Veggies on the sides. Drinking water.  Exercises a few days out of the week, home stretches and exercise  Sleep 3x nocturia. 6 hours. No napping.   BM every day uses for miralax.         Past Medical History:    A-fib (HCC)    Anxiety state    Arrhythmia    A Fib    Blood disorder    Anemia    Cancer (HCC)    bladder     Depression    Disorder of thyroid    Diverticulosis of large intestine    Glaucoma    Hearing impairment    radha hearing aids    High blood pressure    High cholesterol    Incontinence    urine    Visual impairment    readers     Past Surgical History:   Procedure Laterality Date          x 5    Cholecystectomy      Colonoscopy      Hernia surgery      Needle biopsy left      done over 20yrs ago. benign    Other surgical history  2023    cystoscopy     Social History:  Social History     Socioeconomic History    Marital status:    Tobacco Use    Smoking status: Former     Current packs/day: 0.00     Average packs/day: 1 pack/day for 50.0 years (50.0 ttl pk-yrs)     Types: Cigarettes     Start date: 1950     Quit date: 2000     Years since quittin.4    Smokeless tobacco: Never   Vaping Use    Vaping status: Never  Used   Substance and Sexual Activity    Alcohol use: Not Currently     Alcohol/week: 3.0 standard drinks of alcohol     Types: 2 Glasses of wine, 1 Shots of liquor per week     Comment: occasional    Drug use: No   Other Topics Concern    Pt has a pacemaker No    Pt has a defibrillator No    Reaction to local anesthetic No     Family History:  Family History   Problem Relation Age of Onset    Heart Disease Father 60        CAD    Cancer Daughter         THYROID    Other (Other) Daughter         liver cancer    No Known Problems Daughter     Cancer Son         SPIRAL CELL SARCOMA    No Known Problems Son     Other (Other) Son         Interstitial Pneumonia    Uterine Cancer Maternal Grandmother 97    No Known Problems Maternal Grandfather     No Known Problems Paternal Grandmother     No Known Problems Paternal Grandfather     No Known Problems Brother     No Known Problems Brother     No Known Problems Brother     Breast Cancer Maternal Aunt 75     Allergies:  Allergies   Allergen Reactions    Bimatoprost OTHER (SEE COMMENTS)     Eye redness     Current Meds:  Current Outpatient Medications   Medication Sig Dispense Refill    levothyroxine (SYNTHROID) 137 MCG Oral Tab 1 tablet orally on Monday, Wednesday and Friday. 40 tablet 3    levothyroxine 150 MCG Oral Tab 1 tablet orally on Tuesday, Thursday, Saturday and Sunday. 90 tablet 2    rivaroxaban 15 MG Oral Tab Take 1 tablet (15 mg total) by mouth daily with food. 30 tablet 0    lisinopril 10 MG Oral Tab TAKE ONE TABLET BY MOUTH EVERY MORNING 90 tablet 3    atorvastatin 10 MG Oral Tab TAKE ONE TABLET BY MOUTH NIGHTLY 90 tablet 3    dilTIAZem ER (CARTIA XT) 120 MG Oral Capsule SR 24 Hr TAKE ONE CAPSULE BY MOUTH ONE TIME DAILY 90 capsule 3    acetaminophen 500 MG Oral Tab Take 1 tablet (500 mg total) by mouth every 6 (six) hours as needed for Pain.      Cyanocobalamin (VITAMIN B 12 OR) Take 1 capsule by mouth every morning.      Coenzyme Q10 (COQ10) 100 MG Oral Cap  Take 1 capsule by mouth every morning.      Zinc 50 MG Oral Tab Take by mouth daily.      Probiotic Oral Cap Take 1 capsule by mouth daily. take 1 tab daily         Counseling given: Not Answered       REVIEW OF SYSTEMS:   Positive Findings indicated in BOLD    Constitutional: Fever, Chills, Weight Gain, Weight Loss, Night Sweats, Fatigue, Malaise  ENT/Mouth:  Hearing Changes, Ear Pain, Nasal Congestion, Sinus Pain, Hoarseness, Sore throat, Rhinorrhea, Swallowing Difficulty  Eyes: Eye Pain, Swelling, Redness, Foreign Body, Discharge, Vision Changes  Cardiovascular: Chest Pain, SOB, PND, Dyspnea on Exertion, Orthopnea, Claudication, Edema, Palpitations  Respiratory: Cough, Sputum, Wheezing, Shortness of breath  Gastrointestinal: Nausea, Vomiting, Diarrhea, Constipation, Pain, Heartburn, Dysphagia, Bloody stools, Tarry stools  Genitourinary: Dysmenorrhea, Dysuria, Urinary Frequency, Hematuria, Urinary Incontinence, Urgency,  Flank Pain  Musculoskeletal: Arthralgias, Myalgias, Joint Swelling, Joint Stiffness, Back Pain, Neck Pain  Integumentary: Skin Lesions, Pruritis, Hair Changes, Jaundice, Nail changes  Neuro: Weakness, Numbness, Paresthesias, Loss of Consciousness, Syncope, Dizziness, Headache, Falls  Psych: Anxiety, Depression, Insomnia, Suicidal Ideation, Homicidal ideation,   Heme/Lymph: Bruising, Bleeding, Lymphadenopathy  Endocrine: Polyuria, Polydipsia, Temperature Intolerance    EXAM:   Vital Signs:  Blood pressure 110/78, pulse 77, temperature 97.6 °F (36.4 °C), height 5' 2\" (1.575 m), weight 181 lb (82.1 kg), SpO2 99%, not currently breastfeeding.     Constitutional: No acute distress. Alert and oriented x 3.  Eyes: EOMI, PERRLA, clear sclera b/l  HENT: NCAT, Moist mucous membranes, Oropharynx without erythema or exudates  Cardiovascular: S1, S2, no S3, no S4, Regular rate and rhythm, No murmurs/gallops/rubs.   Respiratory: Clear to auscultation bilaterally.  No wheezes/rales/rhonchi  Gastrointestinal:  Soft, nontender, nondistended. Positive bowel sounds x 4. No rebound tenderness.  Genitourinary: No CVA tenderness bilaterally  Neurologic: No focal neurological deficits  Musculoskeletal: Full range of motion of all extremities, no clubbing/swelling/edema  Skin: No lesions, No erythema, no jaundice, Cap Refill < 2s  Psychiatric: Appropriate mood and affect      DATA REVIEWED   Labs:  Recent Results (from the past 8760 hours)   Basic Metabolic Panel (8)    Collection Time: 01/31/25  8:36 AM   Result Value Ref Range    Glucose 95 70 - 99 mg/dL    Sodium 142 136 - 145 mmol/L    Potassium 4.0 3.5 - 5.1 mmol/L    Chloride 107 98 - 112 mmol/L    CO2 28.0 21.0 - 32.0 mmol/L    Anion Gap 7 0 - 18 mmol/L    BUN 12 9 - 23 mg/dL    Creatinine 0.99 0.55 - 1.02 mg/dL    BUN/CREA Ratio 12.1 10.0 - 20.0    Calcium, Total 11.0 (H) 8.7 - 10.4 mg/dL    Calculated Osmolality 294 275 - 295 mOsm/kg    eGFR-Cr 56 (L) >=60 mL/min/1.73m2    Patient Fasting for BMP? Yes      *Note: Due to a large number of results and/or encounters for the requested time period, some results have not been displayed. A complete set of results can be found in Results Review.       Recent Results (from the past 8760 hours)   CBC W/ DIFFERENTIAL    Collection Time: 05/06/24 11:32 AM   Result Value Ref Range    WBC 8.2 4.0 - 11.0 x10(3) uL    RBC 3.90 3.80 - 5.30 x10(6)uL    HGB 12.7 12.0 - 16.0 g/dL    HCT 37.9 35.0 - 48.0 %    MCV 97.2 80.0 - 100.0 fL    MCH 32.6 26.0 - 34.0 pg    MCHC 33.5 31.0 - 37.0 g/dL    RDW-SD 44.4 35.1 - 46.3 fL    RDW 12.5 11.0 - 15.0 %    .0 150.0 - 450.0 10(3)uL    Neutrophil Absolute Prelim 5.85 1.50 - 7.70 x10 (3) uL    Neutrophil Absolute 5.85 1.50 - 7.70 x10(3) uL    Lymphocyte Absolute 1.78 1.00 - 4.00 x10(3) uL    Monocyte Absolute 0.46 0.10 - 1.00 x10(3) uL    Eosinophil Absolute 0.09 0.00 - 0.70 x10(3) uL    Basophil Absolute 0.05 0.00 - 0.20 x10(3) uL    Immature Granulocyte Absolute 0.01 0.00 - 1.00 x10(3) uL     Neutrophil % 71.0 %    Lymphocyte % 21.6 %    Monocyte % 5.6 %    Eosinophil % 1.1 %    Basophil % 0.6 %    Immature Granulocyte % 0.1 %     *Note: Due to a large number of results and/or encounters for the requested time period, some results have not been displayed. A complete set of results can be found in Results Review.     Cystoscopy 12/28/2021  Final Diagnosis:      Urinary bladder tumors; transurethral resection:  Low-grade papillary urothelial carcinoma, noninvasive.  Muscularis propria present.          Cystoscopy 1/7/2025  Final Diagnosis:      Urine:  Adequacy: Satisfactory for evaluation  General Category: Negative for high-grade urothelial carcinoma ([facility])  Diagnosis:  Urothelial cells present  Neutrophils present, few  Red blood cells present, few     ASSESSMENT AND PLAN:     Paresthesias  - Localized to the right hand, benign neurological neurovascular exam  - Also some involvement lower extremities.  - Suspect cervical radiculopathy with mild symptoms.  We will check an x-ray to evaluate osteoarthritis  - Will try home stretches and exercises first.  If no better, she will notify us as we can consider medication management or physical therapy.    Hematuria secondary to bladder tumor  Recall presenting episodes of hematuria in 2021.  Status post cystoscopy with removal and fulguration of bladder tumor 12/28/2021.  Pathology results showed low-grade papillary urothelial carcinoma.  No evidence of residual cancer at that time.  For which she was treated with mitomycin per Dr. Charlton  - Most recently established with Dr. Barrios, on cystoscopy surveillance every 6 months with cytology  - No evidence of recurrence    Permanent atrial fibrillation  -Rate controlled with diltiazem per Dr. Pierre of cardiology  -On rivaroxaban 20 mg for anticoagulation.  -Monitor blood counts  - Last seen by Dr. Pierre 9/9/2024.  Seems to be stable at this time    Hyperparathyroidism  Primary hyperparathyroidism  - Last  DEXA scan normal   - Patient electing for surveillance, with conservative measures.  Ongoing calcium, vitamin D supplementation    Hypertension  -Blood pressure today at goal  - Check blood pressures at home  - Continue with home lisinopril 10 mg daily, diltiazem    Hyperlipidemia  -Last lipid panel overall well-controlled  - Repeat fasting lipid panel  - Continue with atorvastatin 10 mg nightly    Hypothyroidism  -last TFTs at goal  - Repeat Levels  - Continue with Synthroid 137 mcg Monday, Wednesday, Friday. 150 all other days.     CKD stage III  -Last creatinine 1.03, EGFR 50  -Renally dose medications    Elevated fasting glucose  - Most recently hemoglobin A1c 5.7%, fasting glucose 95  - Slight reduction in moderation of carbohydrates may help    Osteoarthritis  Localized to multiple joints  -Most recently seen by orthopedic surgery, Dr. Matthew 2023.  Had right knee arthrocentesis at that time for moderate to severe medial compartment narrowing  -Would recommend initial trial of conservative therapy:    -Acetaminophen 500-650 mg every 4-6 hours as needed for pain relief  -Ibuprofen 200-400 mg every 8 hours as needed for anti-inflammatory and pain relief  -For more severe pain, should notify us as we can consider alternative medication    Open-angle glaucoma  - Established with Dr. Garcia  - Last seen 2024 currently undergoing surveillance    Vitamin D deficiency  - Continue vitamin D supplementation as directed by Dr. Carvajal       Orders This Visit:  Orders Placed This Encounter   Procedures    CBC With Differential With Platelet    Comp Metabolic Panel (14)    Lipid Panel    TSH W Reflex To Free T4    Vitamin D    Hemoglobin A1C       Meds This Visit:  Requested Prescriptions     Signed Prescriptions Disp Refills    levothyroxine (SYNTHROID) 137 MCG Oral Tab 40 tablet 3     Si tablet orally on Monday, Wednesday and Friday.    levothyroxine 150 MCG Oral Tab 90 tablet 2     Si tablet  orally on Tuesday, Thursday, Saturday and Sunday.    rivaroxaban 15 MG Oral Tab 30 tablet 0     Sig: Take 1 tablet (15 mg total) by mouth daily with food.       Imaging & Referrals:  XR CERVICAL SPINE (2-3 VIEWS) (CPT=72040)     Health Maintenance  Return to clinic 6 months for Medicare annual physical    Spent 40 minutes obtaining history, evaluating patient, discussing treatment options, diet, exercise, review of available labs and radiology reports, and completing documentation.     Torrey Silva MD, 02/03/25, 8:56 AM

## 2025-02-01 NOTE — PATIENT INSTRUCTIONS
You are seen in clinic today for follow-up.  Today, did review her most recent set of blood work  - Overall good control of cholesterol levels  - Sugar levels are just slightly high, lets continue moderating carbohydrate intake not just in sweets but also in rice/grain/bread/pasta  - Continue with good protein intake, vegetables    For the numbness and tingling of the right hand and legs, I suspect that there may be some pinching of the nerves in the neck and upper back  - Lets get an x-ray to evaluate the area  - Try some stretches and exercises both of the neck and of the hands.  - Try to find any triggering factors such as sleeping position that could be impinging on the nerves  - Notify us if still no better as we can consider medications or dedicated physical therapy to help open up the areas further    Cystoscopy surveillance per Dr. Barrios, Thankfully no recurrence of bladder tumor status post surgical resection    Periodically check her blood pressures at home, notify us if increasing    Cardiac status seems to be stable with management per Dr. Pierre.  -We refilled her Xarelto for 1 month    We are watching the parathyroid calcium and vitamin D levels with Dr. Carvajal    Return to clinic in 6 months for Medicare annual physical examination.  We did place fasting blood work to be completed prior to next visit

## 2025-02-13 NOTE — TELEPHONE ENCOUNTER
Please notify patient reviewed the x-ray from 2/6, it does show chronic arthritis changes that are mild.  But no fracture or dislocation.  We can proceed with ongoing use of Tylenol, stretches and exercises for the neck and shoulders.  I suspect that there is some pinching of the nerves around the area of the neck and upper back, which may be causing the numbness and tingling symptoms.  If still ongoing, we can do a trial of physical therapy as a neck step.

## 2025-02-25 NOTE — PROGRESS NOTES
Return Office Visit     CHIEF COMPLAINT:     Primary Hyperparathyroidism    HISTORY OF PRESENT ILLNESS:  Jeannette Austin is a 84 year old female who presents for follow up for  evaluation of hyperparathyroidism.   Noted to have calcium elevation for the last few years. Highest calcium 11 ( ULN 10.1)  PTH elevated   Has CKD  Vit D deficiency: 500 units daily     She is not on any calcium supplementation  No renal stones.   DXA scan( 4/2023) shows normal BMD        + constipation : stable , miralax helps  No acid reflux  No falls no fractures  No bone pain, no memory changes     She is not aware of any FH of problems.        Doing okay     CURRENT MEDICATION:    Current Outpatient Medications   Medication Sig Dispense Refill    levothyroxine (SYNTHROID) 137 MCG Oral Tab 1 tablet orally on Monday, Wednesday and Friday. 40 tablet 3    levothyroxine 150 MCG Oral Tab 1 tablet orally on Tuesday, Thursday, Saturday and Sunday. 90 tablet 2    atorvastatin 10 MG Oral Tab Take 1 tablet (10 mg total) by mouth nightly. 90 tablet 3    rivaroxaban 15 MG Oral Tab Take 1 tablet (15 mg total) by mouth daily with food. 30 tablet 0    dilTIAZem ER (CARTIA XT) 120 MG Oral Capsule SR 24 Hr Take 1 capsule (120 mg total) by mouth daily. 90 capsule 3    lisinopril 10 MG Oral Tab TAKE ONE TABLET BY MOUTH EVERY MORNING 90 tablet 3    acetaminophen 500 MG Oral Tab Take 1 tablet (500 mg total) by mouth every 6 (six) hours as needed for Pain.      Cyanocobalamin (VITAMIN B 12 OR) Take 1 capsule by mouth every morning.      Coenzyme Q10 (COQ10) 100 MG Oral Cap Take 1 capsule by mouth every morning.      Zinc 50 MG Oral Tab Take by mouth daily.      Probiotic Oral Cap Take 1 capsule by mouth daily. take 1 tab daily            ALLERGY:  Allergies   Allergen Reactions    Bimatoprost OTHER (SEE COMMENTS)     Eye redness       PAST MEDICAL, SOCIAL AND FAMILY HISTORY:  See past medical history marked as reviewed.  See past surgical history marked  as reviewed.  See past family history marked as reviewed.  See past social history marked as reviewed.    ASSESSMENTS:     REVIEW OF SYSTEMS:  Constitutional: Negative for: weight change, fever, fatigue, cold/heat intolerance  Eyes: Negative for:  Visual changes, proptosis, blurring  ENT: Negative for:  dysphagia, neck swelling, dysphonia  Respiratory: Negative for:  dyspnea, cough  Cardiovascular: Negative for:  chest pain, palpitations, orthopnea  GI: Negative for:  abdominal pain, nausea, vomiting, diarrhea, + constipation, bleeding  Neurology: Negative for: headache, numbness, weakness  Genito-Urinary: Negative for: dysuria, frequency  Psychiatric: Negative for:  depression, anxiety  Hematology/Lymphatics: Negative for: bruising, lower extremity edema  Endocrine: Negative for: polyuria, polydypsia  Skin: Negative for: rash, blister, cellulitis,       PHYSICAL EXAM:   Vitals:    02/25/25 1238   BP: 133/80   Pulse: 82   Weight: 186 lb (84.4 kg)   Height: 5' 2\" (1.575 m)     BMI: Body mass index is 34.02 kg/m².         General Appearance:  alert, well developed, in no acute distress  Head: Atraumatic  Eyes:  normal conjunctivae, sclera., normal sclera and normal pupils  Throat/Neck: normal sound to voice. Normal hearing, normal speech  Respiratory:  Speaking in full sentences, non-labored. no increased work of breathing, no audible wheezing    Neuro: motor grossly intact, moving all extremities without difficulty  Psychiatric:  oriented to time, self, and place  Extremities: no obvious extremity swelling, no lesions      DATA:     Pertinent data reviewed    ASSESSMENT AND PLAN:      Patient is a 84 year old female with primary Hyperparathyroidism.   No history of being on thiazides, lithium glucocorticoids, teriparatide.   No history of excessive milk/calcium consumption. No history of immobilization.  -    TSH is normal. No symptoms or signs of adrenal insufficiency or acromegaly.  The patient has primary  hyperparathyroidism.  DXA normal 2023--> will recheck   Based on CKD, she does meet criteria for surgical management.   PTH is significantly elevated  I have explained this in detail to the patient.   After a discussion, she will like to monitor labs at this time.   Recent calcium is 10.4  Vitamin D is low, but over 20. Will c/w 500 units daily . Get sunlight daily.     PLAN:    - Repeat labs in 3 months  - Continue with vitamin D 500 units for now. Increase sun exposure and vitamin D fortified milk and fish intake  - avoid any calcium containing supplements  - Reviewed symptoms of high calcium , knows to call if she experiences any   - Adequate hydration with atleast 6-8 glasses of water daily  - Patient instructed to avoid factors that cam exacerbate hypercalcemia including thiazide diuretics, dehydration, prolonged bed rest.        Instructed that she shouldn't restrict calcium in her diet, since low calcium levels can increase PTH levels which could cause bone loss. Discussed RDI.  - DXA in May 2025      Patient verbalized a complete  understanding of all of the above and did not have any further questions.        RTC in 10 months  Repeat labs in 3 months  DXA in May 2025   Call for results      Orders Placed This Encounter   Procedures    PTH Intact with minerals    Vitamin D [E]         Luisa Carvajal MD

## 2025-02-26 NOTE — PROGRESS NOTES
Jeannette Austin is a 84 year old female.  Chief Complaint   Patient presents with    Checkup     Dizzy, lightheaded, started 2 weeks ago, a few episodes since     HPI:     ~10 days ago, she bent down to pet a cat and felt suddenly dizzy, felt like she saw lights \"in a square shape\" -- lasted several seconds.  Felt like she needed to lie down, which she did. No other sx - no weakness or confusion.   Had a similar, although less severe, episode 6 days ago when rolling over in bed.  Since then, she has had a few very mild episodes when rolling over in bed.  No associated nausea.  No new meds.  Ambulates w/o difficulty    Similar episode in the past when lying in dentist's chair and extending head back    Current Outpatient Medications   Medication Sig Dispense Refill    atorvastatin 10 MG Oral Tab Take 1 tablet (10 mg total) by mouth nightly. 90 tablet 3    levothyroxine (SYNTHROID) 137 MCG Oral Tab 1 tablet orally on Monday, Wednesday and Friday. 40 tablet 3    levothyroxine 150 MCG Oral Tab 1 tablet orally on Tuesday, Thursday, Saturday and Sunday. 90 tablet 2    rivaroxaban 15 MG Oral Tab Take 1 tablet (15 mg total) by mouth daily with food. 30 tablet 0    dilTIAZem ER (CARTIA XT) 120 MG Oral Capsule SR 24 Hr Take 1 capsule (120 mg total) by mouth daily. 90 capsule 3    lisinopril 10 MG Oral Tab TAKE ONE TABLET BY MOUTH EVERY MORNING 90 tablet 3    acetaminophen 500 MG Oral Tab Take 1 tablet (500 mg total) by mouth every 6 (six) hours as needed for Pain.      Cyanocobalamin (VITAMIN B 12 OR) Take 1 capsule by mouth every morning.      Coenzyme Q10 (COQ10) 100 MG Oral Cap Take 1 capsule by mouth every morning.      Zinc 50 MG Oral Tab Take by mouth daily.      Probiotic Oral Cap Take 1 capsule by mouth daily. take 1 tab daily         Past Medical History:    A-fib (HCC)    Anxiety state    Arrhythmia    A Fib    Blood disorder    Anemia    Cancer (HCC)    bladder     Depression    Disorder of thyroid     Diverticulosis of large intestine    Glaucoma    Hearing impairment    radha hearing aids    High blood pressure    High cholesterol    Incontinence    urine    Visual impairment    readers      Social History:  Social History     Socioeconomic History    Marital status:    Tobacco Use    Smoking status: Former     Current packs/day: 0.00     Average packs/day: 1 pack/day for 50.0 years (50.0 ttl pk-yrs)     Types: Cigarettes     Start date: 1950     Quit date: 2000     Years since quittin.5    Smokeless tobacco: Never   Vaping Use    Vaping status: Never Used   Substance and Sexual Activity    Alcohol use: Not Currently     Alcohol/week: 3.0 standard drinks of alcohol     Types: 2 Glasses of wine, 1 Shots of liquor per week     Comment: occasional    Drug use: No   Other Topics Concern    Pt has a pacemaker No    Pt has a defibrillator No    Reaction to local anesthetic No        REVIEW OF SYSTEMS:   GENERAL HEALTH: feels well otherwise  RESPIRATORY: no SOB  CARDIOVASCULAR: no chest pain/pressure  GI: no nausea, vomiting, diarrhea    Wt Readings from Last 5 Encounters:   25 183 lb (83 kg)   25 186 lb (84.4 kg)   25 181 lb (82.1 kg)   25 180 lb (81.6 kg)   24 190 lb (86.2 kg)     Body mass index is 33.47 kg/m².      EXAM:   /84   Pulse 73   Temp 98.4 °F (36.9 °C)   Ht 5' 2\" (1.575 m)   Wt 183 lb (83 kg)   SpO2 97%   BMI 33.47 kg/m²   GENERAL: well developed, well nourished, in no apparent distress  HEENT: PERRLA, EOMI  NECK: supple, no adenopathy, no bruits  LUNGS: clear to auscultation  CARDIO: irreg irreg, normal S1S2, without murmur or gallop  NEURO: normal speech, CN II-XII intact  Mild dizziness w/lying supine w/head turned to the R (no nystagmus however).  No sx w/head turned to left    ASSESSMENT AND PLAN:     BPPV  -largely resolved  -normal neuro exam  -discussed usual course of condition  -meclizine OTC prn     The patient indicates  understanding of these issues and agrees to the plan.    Consuelo Gill MD, 02/26/25, 11:47 AM

## 2025-03-10 NOTE — PROGRESS NOTES
HPI:    Patient ID: Jeannette Austin is a 84 year old female.    Patient presents for a lesion on the nose and some spots on the face. Was prescribed fluorouracil and states it didn't work. Patient reports the areas dont' cause concern, but are not improving. Denies personal or family hx of skin cancer or skin disease. No draining or tenderness noted. Hx of allergies to medications noted.         Review of Systems   Constitutional:  Negative for chills and fever.   Musculoskeletal:  Negative for arthralgias and myalgias.   Skin:  Positive for rash. Negative for color change and wound.            Current Outpatient Medications   Medication Sig Dispense Refill    fluorouracil 5 % External Cream Apply topically 2 (two) times daily.      atorvastatin 10 MG Oral Tab Take 1 tablet (10 mg total) by mouth nightly. 90 tablet 3    levothyroxine (SYNTHROID) 137 MCG Oral Tab 1 tablet orally on Monday, Wednesday and Friday. 40 tablet 3    levothyroxine 150 MCG Oral Tab 1 tablet orally on Tuesday, Thursday, Saturday and Sunday. 90 tablet 2    rivaroxaban 15 MG Oral Tab Take 1 tablet (15 mg total) by mouth daily with food. 30 tablet 0    dilTIAZem ER (CARTIA XT) 120 MG Oral Capsule SR 24 Hr Take 1 capsule (120 mg total) by mouth daily. 90 capsule 3    lisinopril 10 MG Oral Tab TAKE ONE TABLET BY MOUTH EVERY MORNING 90 tablet 3    acetaminophen 500 MG Oral Tab Take 1 tablet (500 mg total) by mouth every 6 (six) hours as needed for Pain.      Cyanocobalamin (VITAMIN B 12 OR) Take 1 capsule by mouth every morning.      Coenzyme Q10 (COQ10) 100 MG Oral Cap Take 1 capsule by mouth every morning.      Zinc 50 MG Oral Tab Take by mouth daily.      Probiotic Oral Cap Take 1 capsule by mouth daily. take 1 tab daily        Allergies:Allergies[1]   There were no vitals taken for this visit.  There is no height or weight on file to calculate BMI.  PHYSICAL EXAM:   Physical Exam  Constitutional:       General: She is not in acute  distress.     Appearance: Normal appearance.   Skin:     General: Skin is warm and dry.      Findings: Rash present.      Comments: Scaling and erythematous macular lesions noted on the face on the nose. No draining or tenderness noted.    Neurological:      Mental Status: She is alert and oriented to person, place, and time.                ASSESSMENT/PLAN:   1. Actinic keratosis  -After discussion with patient, advised the following:  -Start flourouracil   -Educated to apply 2 times per day for 3 weeks  -Follow-up in 3 weeks.   -May need to do cryotherapy vs biopsy.   -To call or follow-up with worsening symptoms or concerns  -Patient was agreeable to plan and will comply with discussion above.         No orders of the defined types were placed in this encounter.      Meds This Visit:  Requested Prescriptions      No prescriptions requested or ordered in this encounter       Imaging & Referrals:  None         ID#[id number]       [1]   Allergies  Allergen Reactions    Bimatoprost OTHER (SEE COMMENTS)     Eye redness

## 2025-03-24 NOTE — PROGRESS NOTES
HPI:    Patient ID: Jeannette Austin is a 84 year old female.    Patient presents with lesion on the bridge nose. Leison is spreading to left face cheek. Burning present on bridge of nose. Using 5-FU BID with no improvement. Patient has hx of Aks in the past. No personal or family hx of skin cancer. No draining or tenderness noted. Hx of allergies to medications noted.         Review of Systems   Constitutional:  Negative for chills and fever.   Musculoskeletal:  Negative for arthralgias and myalgias.   Skin:  Positive for rash. Negative for color change and wound.            Current Outpatient Medications   Medication Sig Dispense Refill    fluorouracil 5 % External Cream Apply topically 2 (two) times daily.      atorvastatin 10 MG Oral Tab Take 1 tablet (10 mg total) by mouth nightly. 90 tablet 3    levothyroxine (SYNTHROID) 137 MCG Oral Tab 1 tablet orally on Monday, Wednesday and Friday. 40 tablet 3    levothyroxine 150 MCG Oral Tab 1 tablet orally on Tuesday, Thursday, Saturday and Sunday. 90 tablet 2    rivaroxaban 15 MG Oral Tab Take 1 tablet (15 mg total) by mouth daily with food. 30 tablet 0    dilTIAZem ER (CARTIA XT) 120 MG Oral Capsule SR 24 Hr Take 1 capsule (120 mg total) by mouth daily. 90 capsule 3    lisinopril 10 MG Oral Tab TAKE ONE TABLET BY MOUTH EVERY MORNING 90 tablet 3    acetaminophen 500 MG Oral Tab Take 1 tablet (500 mg total) by mouth every 6 (six) hours as needed for Pain.      Cyanocobalamin (VITAMIN B 12 OR) Take 1 capsule by mouth every morning.      Coenzyme Q10 (COQ10) 100 MG Oral Cap Take 1 capsule by mouth every morning.      Zinc 50 MG Oral Tab Take by mouth daily.      Probiotic Oral Cap Take 1 capsule by mouth daily. take 1 tab daily        Allergies:Allergies[1]   There were no vitals taken for this visit.  There is no height or weight on file to calculate BMI.  PHYSICAL EXAM:   Physical Exam  Constitutional:       General: She is not in acute distress.     Appearance:  Normal appearance.   Skin:     General: Skin is warm and dry.      Findings: Rash present.      Comments: Scaling and erythema noted on the nose and the left cheek. No draining or tendenress noted. Tight pink macules noted.    Neurological:      Mental Status: She is alert and oriented to person, place, and time.                ASSESSMENT/PLAN:   1. Actinic keratosis  -After discussion with patient, advised the following:  -Stop 5-FU/calcipotreine compound  -Wait 3 weeks for recovery  -Return in 3 weeks  -Apply vaseline daily to the area to keep the area moist.   -To call or follow-up with worsening symptoms or concerns  -Patient was agreeable to plan and will comply with discussion above.         No orders of the defined types were placed in this encounter.      Meds This Visit:  Requested Prescriptions      No prescriptions requested or ordered in this encounter       Imaging & Referrals:  None         ID#[id number]       [1]   Allergies  Allergen Reactions    Bimatoprost OTHER (SEE COMMENTS)     Eye redness

## 2025-04-17 NOTE — PROGRESS NOTES
HPI:    Patient ID: Jeannette Austin is a 84 year old female.    Patient presents for 3 week follow-up on actinic keratosis on the nose. She has stopped use of effudex on the nose for the past 3 weeks. Has noted improvement. No itching. No pain. No bleeding. No draining or tenderness noted. Hx of allergies to medications noted.     Review of Systems   Constitutional:  Negative for chills and fever.   Musculoskeletal:  Negative for arthralgias and myalgias.   Skin:  Positive for rash. Negative for color change and wound.          Current Medications[1]  Allergies:Allergies[2]   There were no vitals taken for this visit.  There is no height or weight on file to calculate BMI.  PHYSICAL EXAM:   Physical Exam  Constitutional:       General: She is not in acute distress.     Appearance: Normal appearance.   Skin:     General: Skin is warm and dry.      Findings: Rash present.      Comments: Ak resolved on the nose. No draining or tenderness noted. No erythema noted. No scaling noted.    Neurological:      Mental Status: She is alert and oriented to person, place, and time.              ASSESSMENT/PLAN:   1. Actinic keratosis  -After discussion with patient, advised the following:  -Lesion resolved on the nose  -Stop use of cream  -Return if she notices new lesions   -Continue with sun protection.   -To call or follow-up with worsening symptoms or concerns  -Patient was agreeable to plan and will comply with discussion above.       No orders of the defined types were placed in this encounter.      Meds This Visit:  Requested Prescriptions      No prescriptions requested or ordered in this encounter       Imaging & Referrals:  None         ID#[id number]         [1]  Current Outpatient Medications   Medication Sig Dispense Refill   • atorvastatin 10 MG Oral Tab Take 1 tablet (10 mg total) by mouth nightly. 90 tablet 3   • levothyroxine (SYNTHROID) 137 MCG Oral Tab 1 tablet orally on Monday, Wednesday and Friday. 40 tablet 3    • rivaroxaban 15 MG Oral Tab Take 1 tablet (15 mg total) by mouth daily with food. 30 tablet 0   • dilTIAZem ER (CARTIA XT) 120 MG Oral Capsule SR 24 Hr Take 1 capsule (120 mg total) by mouth daily. 90 capsule 3   • lisinopril 10 MG Oral Tab TAKE ONE TABLET BY MOUTH EVERY MORNING 90 tablet 3   • acetaminophen 500 MG Oral Tab Take 1 tablet (500 mg total) by mouth every 6 (six) hours as needed for Pain.     • Cyanocobalamin (VITAMIN B 12 OR) Take 1 capsule by mouth every morning.     • Coenzyme Q10 (COQ10) 100 MG Oral Cap Take 1 capsule by mouth every morning.     • Zinc 50 MG Oral Tab Take by mouth daily.     • Probiotic Oral Cap Take 1 capsule by mouth daily. take 1 tab daily      • fluorouracil 5 % External Cream Apply topically 2 (two) times daily.     • levothyroxine 150 MCG Oral Tab 1 tablet orally on Tuesday, Thursday, Saturday and Sunday. 90 tablet 2   [2]  Allergies  Allergen Reactions   • Bimatoprost OTHER (SEE COMMENTS)     Eye redness

## 2025-07-15 NOTE — PROCEDURES
CYSTOSCOPY (FEMALE)     PRE-OP DIAGNOSIS: bladder lesion/bladder cancer     POST-OP DIAGNOSIS: same     PROCEDURE: Cystsocopy     SURGEON: Regi Barrios MD     ASSISTANT: none      EBL: minimal     FINDINGS:   Urethra: No urethral lesions, no urethral strictures  Bladder: Bilateral ureteral orifices in orthotopic position with efflux, no suspicious or concerning erythematous lesions, no papillary bladder tumors, no stones   Retroflexion: no abnormalities   Other findings: none     INDICATIONS: s/p TURBT with pathology returning benign on 2023 - presents for cystoscopy.      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  She is due for an office cystoscopy and cytology today.     10/21/2021 cytology - negative  10/28/2021 CTU - no urolithiasis, proteinacious cyst  2021 TURBT - LgTa 2.5cm tumor  Treated with 6 cycles of mitomycin C   2022 - 6mm tumor on left upper posterior wall - NOT CANCEROUS  2022 cytology - negative  2022 - TURBT NO CANCER  10/19/2022- cytology/negative  2023 - negative TURBT  2024 - negative     PROCEDURE: Patient was brought to the procedure suite and a timeout was performed identifying the patient,  and procedure being performed.  The risks of the procedure were once again detailed to the patient including bleeding, infection, dysuria.  The patient agreed to proceed.  The patient had a negative urinalysis.  No antibiotics were given to patient prior to this procedure.      She was placed in a supine position on the table and a flexible cystoscope was inserted per urethra.  There were no obvious urethral lesions or strictures. Once in the bladder we performed a full diagnostic/surveillance cystoscopy which demonstrated no abnormalities.  On retroflexion we noted no abnormalities.  The scope was then carefully removed and once again no urethral abnormalities were noted.    There were no  complications after this procedure and the patient tolerated the procedure without issue.    IMPRESSION: cysto negative. Cytology pending.     PLAN:    Cystoscopy 6 months then move to 1 year  Cytology prior

## 2025-07-22 NOTE — ASSESSMENT & PLAN NOTE
Following with cardiology.  Continues on Xarelto for anticoagulation and diltiazem for rate control.

## 2025-07-22 NOTE — PATIENT INSTRUCTIONS
Please start the following medications:   Vaginal estrogen, 1 g intravaginally twice a week  Omeprazole 20 mg daily, might help with the belching     No other medication changes today.     Please have fasting labs done per your convenience.     Please follow up with me in 3 months.

## 2025-07-22 NOTE — ASSESSMENT & PLAN NOTE
Monitor kidney function periodically.  Try to limit nephrotoxic medications/substances.  Orders:    Comp Metabolic Panel (14); Future    Lipid Panel; Future    Hemoglobin A1C; Future    TSH W Reflex To Free T4; Future

## 2025-07-22 NOTE — ASSESSMENT & PLAN NOTE
Continue levothyroxine as above.  Recheck thyroid function.  Orders:    Comp Metabolic Panel (14); Future    Lipid Panel; Future    Hemoglobin A1C; Future    TSH W Reflex To Free T4; Future

## 2025-07-22 NOTE — PROGRESS NOTES
The following individual(s) verbally consented to be recorded using ambient AI listening technology and understand that they can each withdraw their consent to this listening technology at any point by asking the clinician to turn off or pause the recording:    Patient name: Jeannette HUE Austin  Additional names:  None

## 2025-07-22 NOTE — ASSESSMENT & PLAN NOTE
Continue current medication as above.  Blood pressure well-controlled today.  Orders:    Comp Metabolic Panel (14); Future    Lipid Panel; Future    Hemoglobin A1C; Future    TSH W Reflex To Free T4; Future

## 2025-07-22 NOTE — PROGRESS NOTES
Jeannette Austin is a 85 year old female with complaints of:  Chief Complaint: Establish [facility]    HPI     Jeannette Austin is a(n) 85 year old female with a history of hyperparathyroidism, atrial fibrillation, CKD, hyperlipidemia, hypertension who presents to establish care.     Patient has a history of hematuria secondary to a bladder tumor.  Patient has a history of bladder cancer.  She gets cystoscopies with cytology, now every 6-12 months for surveillance.    She experiences urinary incontinence primarily at night, waking up at least three to four times to urinate. The urge is very strong, necessitating proximity to a bathroom to avoid leakage. During the day, she experiences large volumes of urination, while at night, the volume is less. She also experiences leaking urine whenever she laughs, coughs, or sneezes. She has a history of several urinary tract infections and has previously attempted pelvic floor physical therapy, which was not beneficial. She has not used vaginal estrogen before.    She has significant belching, which has been worsening over time. No associated hiccuping, acid reflux symptoms, or trouble swallowing. She does not identify any specific food triggers for the belching.    She experiences intermittent numbness and tingling in both legs, described as 'pins and needles,' particularly noticeable at night when lying down. No associated pain or symptoms radiating from her back or buttocks down her legs.    Patient history of atrial fibrillation.  She follows with Dr. Pierre of cardiology.  She continues on Xarelto 20 mg for anticoagulation, and diltiazem for rate control.  Last followed with Dr. Pierre on 9/9/2024.    Patient has history of hyperparathyroidism, thought to be primary.  She is following with endocrinology.    Patient has a history of hypertension.  Medications include lisinopril 10 mg daily and diltiazem 120 mg daily.  Her blood pressure today is 124/72.    Patient has a  history of hyperlipidemia.  Patient continues on atorvastatin 10 mg daily.    Patient has a history of prediabetes. 5.7 in 1/31/2025.     Patient has a history of hypothyroidism.  She continues on levothyroxine 137 mcg Monday Wednesday and Friday.  On Tuesday, Thursday, Saturday, and Sunday, she takes 150 mcg daily.    Patient has a history of osteoarthritis.  Patient follows with orthopedics, Dr. Matthew. Had shots in both knees.     Patient has a history of open-angle glaucoma, for which she follows with ophthalmology.    Patient is recently brief.  She lost her , who had Alzheimer's, in April 2025.  Furthermore, she is also dealing with the impending grief of her daughter who has terminal liver cancer.  She follows with a therapist.  She also has a lot of friends and family in the area, can provide emotional support.      Past Medical History     Past Medical History[1]     Past Surgical History     Past Surgical History[2]     Family History     Family History[3]    Social History     Short Social Hx on File[4]    Allergies     Allergies[5]    Current Medications     Current Outpatient Medications   Medication Sig Dispense Refill    lisinopril 10 MG Oral Tab Take 1 tablet (10 mg total) by mouth every morning. 90 tablet 3    atorvastatin 10 MG Oral Tab Take 1 tablet (10 mg total) by mouth nightly. 90 tablet 3    levothyroxine (SYNTHROID) 137 MCG Oral Tab 1 tablet orally on Monday, Wednesday and Friday. 40 tablet 3    levothyroxine 150 MCG Oral Tab 1 tablet orally on Tuesday, Thursday, Saturday and Sunday. 90 tablet 2    rivaroxaban 15 MG Oral Tab Take 1 tablet (15 mg total) by mouth daily with food. 30 tablet 0    dilTIAZem ER (CARTIA XT) 120 MG Oral Capsule SR 24 Hr Take 1 capsule (120 mg total) by mouth daily. 90 capsule 3    acetaminophen 500 MG Oral Tab Take 1 tablet (500 mg total) by mouth every 6 (six) hours as needed for Pain.      Cyanocobalamin (VITAMIN B 12 OR) Take 1 capsule by mouth every  morning.      Coenzyme Q10 (COQ10) 100 MG Oral Cap Take 1 capsule by mouth every morning.      Zinc 50 MG Oral Tab Take by mouth daily.      Probiotic Oral Cap Take 1 capsule by mouth daily. take 1 tab daily        No current facility-administered medications for this visit.       Review of Systems     GENERAL HEALTH: feels well otherwise  SKIN: denies any unusual skin lesions or rashes  RESPIRATORY: denies shortness of breath with exertion  CARDIOVASCULAR: denies chest pain on exertion  GI: denies abdominal pain and denies heartburn  : denies any burning with urination, urinary frequency or urgency  NEURO: denies headaches, numbness or tingling, mental status changes  PSYCH: denies depressed mood, anxiety  MUSC: denies muscle aches, joint pain    Physical Exam     /72   Pulse 67   Temp 96.9 °F (36.1 °C) (Temporal)   Ht 5' 2\" (1.575 m)   Wt 186 lb 6.4 oz (84.6 kg)   SpO2 98%   BMI 34.09 kg/m²     GENERAL: well developed, well nourished,in no apparent distress  SKIN: no rashes,no suspicious lesions  HEENT: atraumatic, normocephalic,ears and throat are clear  NECK: supple,no adenopathy,no bruits  LUNGS: clear to auscultation  CARDIO: RRR without murmur  GI: good BS's,no masses, HSM or tenderness  EXTREMITIES: no cyanosis, clubbing or edema    Assessment and Plan     Assessment & Plan  Essential hypertension  Continue current medication as above.  Blood pressure well-controlled today.  Orders:    Comp Metabolic Panel (14); Future    Lipid Panel; Future    Hemoglobin A1C; Future    TSH W Reflex To Free T4; Future    Hypercholesterolemia  Continue statin as above.  Plan to recheck cholesterol panel at next physical.  Orders:    Comp Metabolic Panel (14); Future    Lipid Panel; Future    Hemoglobin A1C; Future    TSH W Reflex To Free T4; Future    Malignant neoplasm of urinary bladder, unspecified site (HCC)  She has undergone multiple surveillance cystoscopies, following with urology for this.       Vitamin  D deficiency  In the setting of hyperparathyroidism.  This is managed by endocrinology.       Primary osteoarthritis involving multiple joints  Has had injections in her knees through orthopedics.  This has helped somewhat.       Hypothyroidism, unspecified type  Continue levothyroxine as above.  Recheck thyroid function.  Orders:    Comp Metabolic Panel (14); Future    Lipid Panel; Future    Hemoglobin A1C; Future    TSH W Reflex To Free T4; Future    Hyperparathyroidism (HCC)  Following with endocrinology.       Stage 3a chronic kidney disease (HCC)  Monitor kidney function periodically.  Try to limit nephrotoxic medications/substances.  Orders:    Comp Metabolic Panel (14); Future    Lipid Panel; Future    Hemoglobin A1C; Future    TSH W Reflex To Free T4; Future    Abnormal fasting glucose  Prediabetes  Recheck A1c.  Orders:    Comp Metabolic Panel (14); Future    Lipid Panel; Future    Hemoglobin A1C; Future    TSH W Reflex To Free T4; Future    Permanent atrial fibrillation (HCC)  Following with cardiology.  Continues on Xarelto for anticoagulation and diltiazem for rate control.       Recent bereavement  Patient recently lost her  in April 2024.  She has a lot of friends family support.  She is also going for therapist.       Numbness and tingling  Experiences intermittent pins and needles sensation in both legs, primarily at night. Symmetrical nature suggests possible spinal cord involvement, potentially due to arthritis in the lower back. Decision to monitor symptoms before considering imaging, as she prefers to hold off on x-ray unless symptoms worsen.  - Monitor symptoms and consider x-ray of the lower back if symptoms worsen or if there is difficulty walking.  -Patient also has cervical spinal stenosis, but does not have any upper extremity symptoms at this time.  The urinary symptoms appear more in line with urge and stress incontinence as opposed to loss of bowel or bladder control.       Mixed  incontinence  Pelvic floor dysfunction  Experiences nocturnal and occasional daytime urinary incontinence characterized by urgency and leakage, associated with pelvic floor dysfunction, likely exacerbated by post-menopausal changes and previous pregnancies. History of multiple UTIs and previous pelvic floor physical therapy without significant improvement. Vaginal estrogen cream is recommended to improve pelvic floor tissue integrity and blood flow, potentially alleviating symptoms. If ineffective, medications to decrease bladder irritability may be considered, with side effects such as constipation and dry mouth discussed.  - Prescribe vaginal estrogen cream (Estrace) to be inserted into the vagina once or twice a week.  - Consider medication to decrease bladder irritability if vaginal estrogen is insufficient, with a discussion of potential side effects such as constipation and dry mouth.       Belching  Reports increased belching without associated acid reflux symptoms such as heartburn or sour taste. Belching may indicate acid reflux despite the absence of typical symptoms. A trial of omeprazole is recommended to assess improvement, with monitoring for food triggers advised.  - Recommend trial of over-the-counter omeprazole (Prilosec) for one month to reduce acid production and assess improvement in belching.  - Advise monitoring for food triggers, particularly acidic foods, coffee, and chocolate.                Current Medications[6]    Requested Prescriptions     Signed Prescriptions Disp Refills    estradiol 0.1 MG/GM Vaginal Cream 42.5 g 3     Sig: Place 1 g vaginally twice a week.    omeprazole 20 MG Oral Capsule Delayed Release 90 capsule 0     Sig: Take 1 capsule (20 mg total) by mouth every morning before breakfast.       Orders Placed This Encounter   Procedures    Comp Metabolic Panel (14)     Standing Status:   Future     Expected Date:   7/22/2025     Expiration Date:   7/22/2026    Lipid Panel      Standing Status:   Future     Expected Date:   2025     Expiration Date:   2026     Release to patient:   Immediate    Hemoglobin A1C     Standing Status:   Future     Expected Date:   2025     Expiration Date:   2026     Release to patient:   Immediate    TSH W Reflex To Free T4     Standing Status:   Future     Expected Date:   2025     Expiration Date:   2026     Release to patient:   Immediate       No follow-ups on file.    The patient indicates understanding of these issues and agrees to the plan.    Electronically signed by Rashmi Warner MD 25             [1]   Past Medical History:   A-fib (HCC)    Anxiety state    Arrhythmia    A Fib    Blood disorder    Anemia    Cancer (HCC)    bladder     Depression    Disorder of thyroid    Diverticulosis of large intestine    Glaucoma    Hearing impairment    radha hearing aids    High blood pressure    High cholesterol    Incontinence    urine    Visual impairment    readers   [2]   Past Surgical History:  Procedure Laterality Date          x 5    Cholecystectomy      Colonoscopy      Hernia surgery      Needle biopsy left      done over 20yrs ago. benign    Other surgical history  2023    cystoscopy   [3]   Family History  Problem Relation Age of Onset    Heart Disease Father 60        CAD    Cancer Daughter         THYROID    Other (Other) Daughter         liver cancer    No Known Problems Daughter     Cancer Son         SPIRAL CELL SARCOMA    No Known Problems Son     Other (Other) Son         Interstitial Pneumonia    Uterine Cancer Maternal Grandmother 97    No Known Problems Maternal Grandfather     No Known Problems Paternal Grandmother     No Known Problems Paternal Grandfather     No Known Problems Brother     No Known Problems Brother     No Known Problems Brother     Breast Cancer Maternal Aunt 75   [4]   Social History  Socioeconomic History    Marital status:    Tobacco Use    Smoking status: Former      Current packs/day: 0.00     Average packs/day: 1 pack/day for 50.0 years (50.0 ttl pk-yrs)     Types: Cigarettes     Start date: 1950     Quit date: 2000     Years since quittin.9    Smokeless tobacco: Never   Vaping Use    Vaping status: Never Used   Substance and Sexual Activity    Alcohol use: Not Currently     Alcohol/week: 3.0 standard drinks of alcohol     Types: 2 Glasses of wine, 1 Shots of liquor per week     Comment: occasional    Drug use: No   Other Topics Concern    Pt has a pacemaker No    Pt has a defibrillator No    Reaction to local anesthetic No   [5] No Active Allergies  [6]    estradiol 0.1 MG/GM Vaginal Cream Place 1 g vaginally twice a week. 42.5 g 3    omeprazole 20 MG Oral Capsule Delayed Release Take 1 capsule (20 mg total) by mouth every morning before breakfast. 90 capsule 0    lisinopril 10 MG Oral Tab Take 1 tablet (10 mg total) by mouth every morning. 90 tablet 3    atorvastatin 10 MG Oral Tab Take 1 tablet (10 mg total) by mouth nightly. 90 tablet 3    levothyroxine (SYNTHROID) 137 MCG Oral Tab 1 tablet orally on Monday, Wednesday and Friday. 40 tablet 3    levothyroxine 150 MCG Oral Tab 1 tablet orally on Tuesday, Thursday, Saturday and . 90 tablet 2    rivaroxaban 15 MG Oral Tab Take 1 tablet (15 mg total) by mouth daily with food. 30 tablet 0    dilTIAZem ER (CARTIA XT) 120 MG Oral Capsule SR 24 Hr Take 1 capsule (120 mg total) by mouth daily. 90 capsule 3    acetaminophen 500 MG Oral Tab Take 1 tablet (500 mg total) by mouth every 6 (six) hours as needed for Pain.      Cyanocobalamin (VITAMIN B 12 OR) Take 1 capsule by mouth every morning.      Coenzyme Q10 (COQ10) 100 MG Oral Cap Take 1 capsule by mouth every morning.      Zinc 50 MG Oral Tab Take by mouth daily.      Probiotic Oral Cap Take 1 capsule by mouth daily. take 1 tab daily

## (undated) NOTE — Clinical Note
Thank you for the consult. I saw Ms. Magdalene Hampton in the endocrine/diabetes clinic today. Please see attached my note. Please feel free to contact me with any questions. Thanks!

## (undated) NOTE — LETTER
No referring provider defined for this encounter.        10/21/21        Patient: Bc Schultz   YOB: 1940   Date of Visit: 10/21/2021       Dear  Dr. Valentine Ferris MD,      Thank you for referring Bc Schultz to my [facility] cytology.     (N39.41) Urge incontinence  Chronic. Problem started multiple years ago. Patient feels this has progressively worsened. The patient wears 2x pads a day. She is not taking any medication for this. 9/23/2021 Urine culture = negative.  She feels cytology--we will notify you of the results by means of \"my chart\".     2. Please schedule CT urogram  --- to investigate of the urinary tract, seeking to find the cause of the episode of visible blood in your urine on September 23.           Please call